# Patient Record
Sex: MALE | Race: WHITE | NOT HISPANIC OR LATINO | Employment: OTHER | URBAN - NONMETROPOLITAN AREA
[De-identification: names, ages, dates, MRNs, and addresses within clinical notes are randomized per-mention and may not be internally consistent; named-entity substitution may affect disease eponyms.]

---

## 2017-01-01 ENCOUNTER — HOSPITAL ENCOUNTER (OUTPATIENT)
Facility: HOSPITAL | Age: 77
Discharge: HOME | End: 2017-02-24
Attending: INTERNAL MEDICINE | Admitting: INTERNAL MEDICINE

## 2017-01-01 ENCOUNTER — HOSPITAL ENCOUNTER (OUTPATIENT)
Dept: CT IMAGING | Facility: HOSPITAL | Age: 77
Discharge: HOME | End: 2017-01-10
Attending: INTERNAL MEDICINE | Admitting: INTERNAL MEDICINE

## 2017-01-01 ENCOUNTER — OFFICE VISIT (OUTPATIENT)
Dept: CARDIOLOGY | Facility: CLINIC | Age: 77
End: 2017-01-01

## 2017-01-01 ENCOUNTER — INFUSION (OUTPATIENT)
Dept: ONCOLOGY | Facility: HOSPITAL | Age: 77
End: 2017-01-01
Attending: INTERNAL MEDICINE

## 2017-01-01 ENCOUNTER — TELEPHONE (OUTPATIENT)
Dept: CARDIOLOGY | Facility: CLINIC | Age: 77
End: 2017-01-01

## 2017-01-01 ENCOUNTER — APPOINTMENT (OUTPATIENT)
Dept: PULMONOLOGY | Facility: HOSPITAL | Age: 77
End: 2017-01-01

## 2017-01-01 ENCOUNTER — HOSPITAL ENCOUNTER (OUTPATIENT)
Dept: CT IMAGING | Facility: HOSPITAL | Age: 77
Discharge: HOME | End: 2017-01-10
Attending: INTERNAL MEDICINE

## 2017-01-01 ENCOUNTER — HOSPITAL ENCOUNTER (INPATIENT)
Facility: HOSPITAL | Age: 77
LOS: 3 days | Discharge: HOME HEALTH | End: 2017-05-06
Attending: FAMILY MEDICINE | Admitting: INTERNAL MEDICINE

## 2017-01-01 ENCOUNTER — LAB (OUTPATIENT)
Dept: ONCOLOGY | Facility: CLINIC | Age: 77
End: 2017-01-01

## 2017-01-01 ENCOUNTER — TELEPHONE (OUTPATIENT)
Dept: FAMILY MEDICINE CLINIC | Age: 77
End: 2017-01-01

## 2017-01-01 ENCOUNTER — TELEPHONE (OUTPATIENT)
Dept: CARDIAC SURGERY | Facility: CLINIC | Age: 77
End: 2017-01-01

## 2017-01-01 ENCOUNTER — OFFICE VISIT (OUTPATIENT)
Dept: FAMILY MEDICINE CLINIC | Age: 77
End: 2017-01-01
Payer: MEDICARE

## 2017-01-01 ENCOUNTER — APPOINTMENT (OUTPATIENT)
Dept: GENERAL RADIOLOGY | Facility: HOSPITAL | Age: 77
End: 2017-01-01

## 2017-01-01 ENCOUNTER — HOSPITAL ENCOUNTER (INPATIENT)
Facility: HOSPITAL | Age: 77
LOS: 3 days | Discharge: HOME | End: 2017-04-16
Attending: EMERGENCY MEDICINE | Admitting: INTERNAL MEDICINE

## 2017-01-01 ENCOUNTER — HOSPITAL ENCOUNTER (OUTPATIENT)
Dept: CT IMAGING | Facility: HOSPITAL | Age: 77
End: 2017-01-01

## 2017-01-01 ENCOUNTER — APPOINTMENT (OUTPATIENT)
Dept: ULTRASOUND IMAGING | Facility: HOSPITAL | Age: 77
End: 2017-01-01

## 2017-01-01 ENCOUNTER — HOSPITAL ENCOUNTER (OUTPATIENT)
Dept: PULMONOLOGY | Facility: HOSPITAL | Age: 77
Discharge: HOME | End: 2017-05-01
Admitting: NURSE PRACTITIONER

## 2017-01-01 ENCOUNTER — APPOINTMENT (OUTPATIENT)
Dept: CARDIOLOGY | Facility: HOSPITAL | Age: 77
End: 2017-01-01

## 2017-01-01 ENCOUNTER — APPOINTMENT (OUTPATIENT)
Dept: CARDIOLOGY | Facility: HOSPITAL | Age: 77
End: 2017-01-01
Attending: FAMILY MEDICINE

## 2017-01-01 ENCOUNTER — HOSPITAL ENCOUNTER (OUTPATIENT)
Dept: GENERAL RADIOLOGY | Age: 77
Discharge: HOME | End: 2017-04-05
Payer: MEDICARE

## 2017-01-01 ENCOUNTER — APPOINTMENT (OUTPATIENT)
Dept: CT IMAGING | Facility: HOSPITAL | Age: 77
End: 2017-01-01

## 2017-01-01 ENCOUNTER — HOSPITAL ENCOUNTER (OUTPATIENT)
Dept: CT IMAGING | Facility: HOSPITAL | Age: 77
Discharge: HOME | End: 2017-05-01
Admitting: INTERNAL MEDICINE

## 2017-01-01 ENCOUNTER — OFFICE VISIT (OUTPATIENT)
Dept: CARDIAC SURGERY | Facility: CLINIC | Age: 77
End: 2017-01-01

## 2017-01-01 ENCOUNTER — OFFICE VISIT (OUTPATIENT)
Dept: ONCOLOGY | Facility: CLINIC | Age: 77
End: 2017-01-01

## 2017-01-01 ENCOUNTER — HOSPITAL ENCOUNTER (OUTPATIENT)
Dept: GENERAL RADIOLOGY | Age: 77
Discharge: HOME | End: 2017-01-16
Payer: MEDICARE

## 2017-01-01 ENCOUNTER — TRANSCRIBE ORDERS (OUTPATIENT)
Dept: ADMINISTRATIVE | Facility: HOSPITAL | Age: 77
End: 2017-01-01

## 2017-01-01 VITALS
TEMPERATURE: 97.5 F | SYSTOLIC BLOOD PRESSURE: 114 MMHG | DIASTOLIC BLOOD PRESSURE: 56 MMHG | WEIGHT: 225 LBS | BODY MASS INDEX: 34.21 KG/M2 | OXYGEN SATURATION: 87 % | HEART RATE: 71 BPM | RESPIRATION RATE: 16 BRPM

## 2017-01-01 VITALS
SYSTOLIC BLOOD PRESSURE: 116 MMHG | DIASTOLIC BLOOD PRESSURE: 62 MMHG | TEMPERATURE: 98.2 F | HEART RATE: 103 BPM | BODY MASS INDEX: 31.07 KG/M2 | HEART RATE: 112 BPM | BODY MASS INDEX: 33.3 KG/M2 | SYSTOLIC BLOOD PRESSURE: 110 MMHG | RESPIRATION RATE: 16 BRPM | DIASTOLIC BLOOD PRESSURE: 62 MMHG | OXYGEN SATURATION: 93 % | OXYGEN SATURATION: 94 % | WEIGHT: 205 LBS | HEIGHT: 68 IN | WEIGHT: 219 LBS

## 2017-01-01 VITALS
BODY MASS INDEX: 33.49 KG/M2 | DIASTOLIC BLOOD PRESSURE: 64 MMHG | OXYGEN SATURATION: 97 % | WEIGHT: 221 LBS | HEART RATE: 95 BPM | SYSTOLIC BLOOD PRESSURE: 112 MMHG | HEIGHT: 68 IN

## 2017-01-01 VITALS
DIASTOLIC BLOOD PRESSURE: 70 MMHG | HEART RATE: 67 BPM | OXYGEN SATURATION: 97 % | HEIGHT: 68 IN | SYSTOLIC BLOOD PRESSURE: 110 MMHG | WEIGHT: 232 LBS | BODY MASS INDEX: 35.16 KG/M2

## 2017-01-01 VITALS
HEIGHT: 68 IN | WEIGHT: 216.1 LBS | RESPIRATION RATE: 20 BRPM | OXYGEN SATURATION: 91 % | HEART RATE: 96 BPM | DIASTOLIC BLOOD PRESSURE: 63 MMHG | SYSTOLIC BLOOD PRESSURE: 125 MMHG | BODY MASS INDEX: 32.75 KG/M2 | TEMPERATURE: 98 F

## 2017-01-01 VITALS
DIASTOLIC BLOOD PRESSURE: 62 MMHG | BODY MASS INDEX: 35.31 KG/M2 | WEIGHT: 233 LBS | HEIGHT: 68 IN | HEART RATE: 83 BPM | OXYGEN SATURATION: 94 % | SYSTOLIC BLOOD PRESSURE: 110 MMHG

## 2017-01-01 VITALS
WEIGHT: 217 LBS | HEART RATE: 58 BPM | OXYGEN SATURATION: 96 % | DIASTOLIC BLOOD PRESSURE: 56 MMHG | RESPIRATION RATE: 18 BRPM | HEIGHT: 68 IN | SYSTOLIC BLOOD PRESSURE: 119 MMHG | TEMPERATURE: 97.4 F | BODY MASS INDEX: 32.89 KG/M2

## 2017-01-01 VITALS
TEMPERATURE: 98.1 F | HEART RATE: 70 BPM | DIASTOLIC BLOOD PRESSURE: 72 MMHG | WEIGHT: 230 LBS | BODY MASS INDEX: 34.86 KG/M2 | HEIGHT: 68 IN | RESPIRATION RATE: 20 BRPM | SYSTOLIC BLOOD PRESSURE: 126 MMHG | OXYGEN SATURATION: 96 %

## 2017-01-01 VITALS
SYSTOLIC BLOOD PRESSURE: 118 MMHG | OXYGEN SATURATION: 90 % | DIASTOLIC BLOOD PRESSURE: 60 MMHG | HEIGHT: 68 IN | HEART RATE: 73 BPM | WEIGHT: 233 LBS | BODY MASS INDEX: 35.31 KG/M2

## 2017-01-01 VITALS
HEIGHT: 68 IN | HEART RATE: 71 BPM | DIASTOLIC BLOOD PRESSURE: 79 MMHG | SYSTOLIC BLOOD PRESSURE: 122 MMHG | BODY MASS INDEX: 35.31 KG/M2 | RESPIRATION RATE: 18 BRPM | TEMPERATURE: 97.8 F | WEIGHT: 233 LBS | OXYGEN SATURATION: 95 %

## 2017-01-01 VITALS
OXYGEN SATURATION: 98 % | DIASTOLIC BLOOD PRESSURE: 60 MMHG | SYSTOLIC BLOOD PRESSURE: 110 MMHG | HEIGHT: 68 IN | HEART RATE: 60 BPM | RESPIRATION RATE: 16 BRPM | TEMPERATURE: 98.4 F | WEIGHT: 236.9 LBS | BODY MASS INDEX: 35.9 KG/M2

## 2017-01-01 VITALS
WEIGHT: 227 LBS | BODY MASS INDEX: 34.52 KG/M2 | SYSTOLIC BLOOD PRESSURE: 102 MMHG | RESPIRATION RATE: 16 BRPM | HEART RATE: 67 BPM | DIASTOLIC BLOOD PRESSURE: 56 MMHG | OXYGEN SATURATION: 95 % | TEMPERATURE: 97.8 F

## 2017-01-01 VITALS
OXYGEN SATURATION: 90 % | RESPIRATION RATE: 16 BRPM | DIASTOLIC BLOOD PRESSURE: 70 MMHG | HEART RATE: 89 BPM | SYSTOLIC BLOOD PRESSURE: 138 MMHG | TEMPERATURE: 97.9 F

## 2017-01-01 VITALS
HEIGHT: 68 IN | DIASTOLIC BLOOD PRESSURE: 64 MMHG | HEART RATE: 97 BPM | WEIGHT: 218 LBS | BODY MASS INDEX: 33.04 KG/M2 | OXYGEN SATURATION: 94 % | SYSTOLIC BLOOD PRESSURE: 118 MMHG

## 2017-01-01 VITALS
OXYGEN SATURATION: 96 % | SYSTOLIC BLOOD PRESSURE: 86 MMHG | WEIGHT: 236 LBS | RESPIRATION RATE: 18 BRPM | TEMPERATURE: 98 F | HEART RATE: 96 BPM | BODY MASS INDEX: 35.77 KG/M2 | HEIGHT: 68 IN | DIASTOLIC BLOOD PRESSURE: 64 MMHG

## 2017-01-01 DIAGNOSIS — R09.02 HYPOXIA: ICD-10-CM

## 2017-01-01 DIAGNOSIS — D69.6 THROMBOPENIA (HCC): ICD-10-CM

## 2017-01-01 DIAGNOSIS — I25.118 CORONARY ARTERY DISEASE OF NATIVE ARTERY OF NATIVE HEART WITH STABLE ANGINA PECTORIS (HCC): ICD-10-CM

## 2017-01-01 DIAGNOSIS — C38.4 MALIGNANT NEOPLASM OF PLEURA (HCC): ICD-10-CM

## 2017-01-01 DIAGNOSIS — C45.0 MESOTHELIOMA OF PLEURA (HCC): ICD-10-CM

## 2017-01-01 DIAGNOSIS — M54.50 CHRONIC LOW BACK PAIN WITHOUT SCIATICA, UNSPECIFIED BACK PAIN LATERALITY: ICD-10-CM

## 2017-01-01 DIAGNOSIS — R07.9 CHEST PAIN ON EXERTION: ICD-10-CM

## 2017-01-01 DIAGNOSIS — J44.9 CHRONIC OBSTRUCTIVE PULMONARY DISEASE, UNSPECIFIED COPD TYPE (HCC): Chronic | ICD-10-CM

## 2017-01-01 DIAGNOSIS — R20.2 NUMBNESS AND TINGLING OF RIGHT FACE: ICD-10-CM

## 2017-01-01 DIAGNOSIS — I50.9 ACUTE CONGESTIVE HEART FAILURE, UNSPECIFIED CONGESTIVE HEART FAILURE TYPE: Primary | ICD-10-CM

## 2017-01-01 DIAGNOSIS — R73.9 HYPERGLYCEMIA: Primary | ICD-10-CM

## 2017-01-01 DIAGNOSIS — R06.02 SHORTNESS OF BREATH: ICD-10-CM

## 2017-01-01 DIAGNOSIS — R20.0 NUMBNESS AND TINGLING OF RIGHT FACE: ICD-10-CM

## 2017-01-01 DIAGNOSIS — I20.1 VARIANT ANGINA (HCC): ICD-10-CM

## 2017-01-01 DIAGNOSIS — C38.4 MALIGNANT NEOPLASM OF PARIETAL PLEURA (HCC): ICD-10-CM

## 2017-01-01 DIAGNOSIS — C38.4 MALIGNANT NEOPLASM OF PLEURA (HCC): Primary | ICD-10-CM

## 2017-01-01 DIAGNOSIS — I25.119 CORONARY ARTERY DISEASE INVOLVING NATIVE CORONARY ARTERY OF NATIVE HEART WITH ANGINA PECTORIS (HCC): Primary | ICD-10-CM

## 2017-01-01 DIAGNOSIS — Z51.11 ENCOUNTER FOR ANTINEOPLASTIC CHEMOTHERAPY: ICD-10-CM

## 2017-01-01 DIAGNOSIS — I20.9 ISCHEMIC CHEST PAIN (HCC): ICD-10-CM

## 2017-01-01 DIAGNOSIS — E11.9 TYPE 2 DIABETES MELLITUS WITHOUT COMPLICATION, WITHOUT LONG-TERM CURRENT USE OF INSULIN (HCC): ICD-10-CM

## 2017-01-01 DIAGNOSIS — R73.9 HYPERGLYCEMIA: ICD-10-CM

## 2017-01-01 DIAGNOSIS — G50.0 TRIGEMINAL NEURALGIA OF RIGHT SIDE OF FACE: ICD-10-CM

## 2017-01-01 DIAGNOSIS — I10 ESSENTIAL HYPERTENSION: Primary | Chronic | ICD-10-CM

## 2017-01-01 DIAGNOSIS — Z74.09 IMPAIRED FUNCTIONAL MOBILITY AND ACTIVITY TOLERANCE: ICD-10-CM

## 2017-01-01 DIAGNOSIS — D64.9 ANEMIA, UNSPECIFIED TYPE: ICD-10-CM

## 2017-01-01 DIAGNOSIS — C45.9 MESOTHELIOMA (HCC): ICD-10-CM

## 2017-01-01 DIAGNOSIS — I25.10 CORONARY ARTERY DISEASE INVOLVING NATIVE CORONARY ARTERY OF NATIVE HEART WITHOUT ANGINA PECTORIS: ICD-10-CM

## 2017-01-01 DIAGNOSIS — E03.2 DRUG-INDUCED HYPOTHYROIDISM: ICD-10-CM

## 2017-01-01 DIAGNOSIS — R79.89 ELEVATED SERUM CREATININE: ICD-10-CM

## 2017-01-01 DIAGNOSIS — R06.02 SHORTNESS OF BREATH: Primary | ICD-10-CM

## 2017-01-01 DIAGNOSIS — R20.2 NUMBNESS AND TINGLING OF RIGHT FACE: Primary | ICD-10-CM

## 2017-01-01 DIAGNOSIS — N28.9 DECREASED RENAL FUNCTION: Primary | ICD-10-CM

## 2017-01-01 DIAGNOSIS — F41.9 ANXIETY: ICD-10-CM

## 2017-01-01 DIAGNOSIS — N28.9 RENAL INSUFFICIENCY: ICD-10-CM

## 2017-01-01 DIAGNOSIS — G44.52 NEW DAILY PERSISTENT HEADACHE: ICD-10-CM

## 2017-01-01 DIAGNOSIS — N17.9 ACUTE RENAL FAILURE, UNSPECIFIED ACUTE RENAL FAILURE TYPE (HCC): ICD-10-CM

## 2017-01-01 DIAGNOSIS — I20.9 ISCHEMIC CHEST PAIN (HCC): Primary | ICD-10-CM

## 2017-01-01 DIAGNOSIS — C38.4 MALIGNANT NEOPLASM OF PARIETAL PLEURA (HCC): Primary | ICD-10-CM

## 2017-01-01 DIAGNOSIS — G89.29 CHRONIC LOW BACK PAIN WITHOUT SCIATICA, UNSPECIFIED BACK PAIN LATERALITY: ICD-10-CM

## 2017-01-01 DIAGNOSIS — K59.00 CONSTIPATION, UNSPECIFIED CONSTIPATION TYPE: Primary | ICD-10-CM

## 2017-01-01 DIAGNOSIS — J44.9 CHRONIC OBSTRUCTIVE PULMONARY DISEASE, UNSPECIFIED COPD TYPE (HCC): Primary | Chronic | ICD-10-CM

## 2017-01-01 DIAGNOSIS — R06.02 SOB (SHORTNESS OF BREATH): ICD-10-CM

## 2017-01-01 DIAGNOSIS — R20.0 NUMBNESS AND TINGLING OF RIGHT FACE: Primary | ICD-10-CM

## 2017-01-01 DIAGNOSIS — C45.0 MESOTHELIOMA (PLEURAL) (HCC): ICD-10-CM

## 2017-01-01 DIAGNOSIS — I25.119 CORONARY ARTERY DISEASE INVOLVING NATIVE CORONARY ARTERY OF NATIVE HEART WITH ANGINA PECTORIS (HCC): ICD-10-CM

## 2017-01-01 DIAGNOSIS — I10 ESSENTIAL HYPERTENSION: Chronic | ICD-10-CM

## 2017-01-01 DIAGNOSIS — K27.9 PUD (PEPTIC ULCER DISEASE): Primary | ICD-10-CM

## 2017-01-01 DIAGNOSIS — Z74.09 IMPAIRED FUNCTIONAL MOBILITY, BALANCE, GAIT, AND ENDURANCE: ICD-10-CM

## 2017-01-01 DIAGNOSIS — M15.9 PRIMARY OSTEOARTHRITIS INVOLVING MULTIPLE JOINTS: ICD-10-CM

## 2017-01-01 DIAGNOSIS — I95.9 HYPOTENSION, UNSPECIFIED HYPOTENSION TYPE: Primary | ICD-10-CM

## 2017-01-01 DIAGNOSIS — J90 PLEURAL EFFUSION: ICD-10-CM

## 2017-01-01 DIAGNOSIS — E87.5 HYPERKALEMIA: Primary | ICD-10-CM

## 2017-01-01 DIAGNOSIS — I25.118 ATHEROSCLEROTIC HEART DISEASE OF NATIVE CORONARY ARTERY WITH OTHER FORMS OF ANGINA PECTORIS (HCC): ICD-10-CM

## 2017-01-01 DIAGNOSIS — C45.0 MESOTHELIOMA (PLEURAL) (HCC): Primary | ICD-10-CM

## 2017-01-01 DIAGNOSIS — J96.11 CHRONIC RESPIRATORY FAILURE WITH HYPOXIA (HCC): ICD-10-CM

## 2017-01-01 LAB
ABO + RH BLD: NORMAL
ABO GROUP BLD: NORMAL
ALBUMIN SERPL-MCNC: 3.2 G/DL (ref 3.5–5)
ALBUMIN SERPL-MCNC: 3.2 G/DL (ref 3.5–5.2)
ALBUMIN SERPL-MCNC: 3.8 G/DL (ref 3.5–5)
ALBUMIN SERPL-MCNC: 4.2 G/DL (ref 3.5–5)
ALBUMIN SERPL-MCNC: 4.4 G/DL (ref 3.5–5)
ALBUMIN/GLOB SERPL: 0.8 G/DL (ref 1.1–2.5)
ALBUMIN/GLOB SERPL: 1 G/DL (ref 1.1–2.5)
ALBUMIN/GLOB SERPL: 1.1 G/DL
ALBUMIN/GLOB SERPL: 1.3 G/DL (ref 1.1–2.5)
ALP BLD-CCNC: 113 U/L (ref 40–130)
ALP SERPL-CCNC: 111 U/L (ref 24–120)
ALP SERPL-CCNC: 125 U/L (ref 24–120)
ALP SERPL-CCNC: 73 U/L (ref 24–120)
ALP SERPL-CCNC: 89 U/L (ref 38–126)
ALT SERPL W P-5'-P-CCNC: 18 U/L (ref 0–54)
ALT SERPL W P-5'-P-CCNC: 24 U/L (ref 0–54)
ALT SERPL W P-5'-P-CCNC: 37 U/L (ref 21–72)
ALT SERPL W P-5'-P-CCNC: 48 U/L (ref 0–54)
ALT SERPL-CCNC: 24 U/L (ref 5–41)
ANION GAP SERPL CALCULATED.3IONS-SCNC: 10 MMOL/L (ref 4–13)
ANION GAP SERPL CALCULATED.3IONS-SCNC: 11 MMOL/L (ref 4–13)
ANION GAP SERPL CALCULATED.3IONS-SCNC: 12 MMOL/L (ref 4–13)
ANION GAP SERPL CALCULATED.3IONS-SCNC: 12 MMOL/L (ref 4–13)
ANION GAP SERPL CALCULATED.3IONS-SCNC: 13 MMOL/L
ANION GAP SERPL CALCULATED.3IONS-SCNC: 13 MMOL/L (ref 4–13)
ANION GAP SERPL CALCULATED.3IONS-SCNC: 13 MMOL/L (ref 4–13)
ANION GAP SERPL CALCULATED.3IONS-SCNC: 14 MMOL/L (ref 4–13)
ANION GAP SERPL CALCULATED.3IONS-SCNC: 14 MMOL/L (ref 7–19)
ANION GAP SERPL CALCULATED.3IONS-SCNC: 15 MMOL/L (ref 7–19)
ANION GAP SERPL CALCULATED.3IONS-SCNC: 9 MMOL/L (ref 4–13)
ANISOCYTOSIS BLD QL: ABNORMAL
ARTERIAL PATENCY WRIST A: ABNORMAL
ARTERIAL PATENCY WRIST A: ABNORMAL
AST SERPL-CCNC: 20 U/L (ref 5–40)
AST SERPL-CCNC: 29 U/L (ref 5–40)
AST SERPL-CCNC: 33 U/L (ref 7–45)
AST SERPL-CCNC: 36 U/L (ref 7–45)
AST SERPL-CCNC: 36 U/L (ref 7–45)
ATMOSPHERIC PRESS: ABNORMAL MMHG
ATMOSPHERIC PRESS: ABNORMAL MMHG
AUTO MIXED CELLS #: 0.8 10*3/UL (ref 0.1–1.5)
AUTO MIXED CELLS %: 10.3 % (ref 0.2–15.1)
BACTERIA SPEC AEROBE CULT: NORMAL
BACTERIA SPEC AEROBE CULT: NORMAL
BACTERIA UR QL AUTO: ABNORMAL /HPF
BANDED NEUTROPHILS RELATIVE PERCENT: 1 % (ref 0–5)
BASE EXCESS BLDA CALC-SCNC: 1.8 MMOL/L (ref -2–2)
BASE EXCESS BLDA CALC-SCNC: 4.7 MMOL/L (ref -2–2)
BASOPHILS # BLD AUTO: 0.03 10*3/MM3 (ref 0–0.2)
BASOPHILS # BLD AUTO: 0.03 10*3/MM3 (ref 0–0.2)
BASOPHILS # BLD AUTO: 0.04 10*3/MM3 (ref 0–0.2)
BASOPHILS # BLD MANUAL: 0.11 10*3/MM3 (ref 0–0.2)
BASOPHILS ABSOLUTE: 0 K/UL (ref 0–0.2)
BASOPHILS ABSOLUTE: 0 K/UL (ref 0–0.2)
BASOPHILS NFR BLD AUTO: 0.2 % (ref 0–2)
BASOPHILS NFR BLD AUTO: 0.4 % (ref 0–2)
BASOPHILS NFR BLD AUTO: 0.5 % (ref 0–2)
BASOPHILS NFR BLD AUTO: 0.6 % (ref 0–2)
BASOPHILS NFR BLD AUTO: 0.7 % (ref 0–2)
BASOPHILS NFR BLD AUTO: 1 % (ref 0–2)
BASOPHILS RELATIVE PERCENT: 0 % (ref 0–1)
BASOPHILS RELATIVE PERCENT: 0.3 % (ref 0–1)
BDY SITE: ABNORMAL
BDY SITE: ABNORMAL
BH BB BLOOD EXPIRATION DATE: NORMAL
BH BB BLOOD TYPE BARCODE: 5100
BH BB DISPENSE STATUS: NORMAL
BH BB PRODUCT CODE: NORMAL
BH BB UNIT NUMBER: NORMAL
BH CV ECHO MEAS - AO MAX PG (FULL): 13.4 MMHG
BH CV ECHO MEAS - AO MAX PG (FULL): 13.4 MMHG
BH CV ECHO MEAS - AO MAX PG: 17.1 MMHG
BH CV ECHO MEAS - AO MAX PG: 17.5 MMHG
BH CV ECHO MEAS - AO MEAN PG (FULL): 6 MMHG
BH CV ECHO MEAS - AO MEAN PG (FULL): 7 MMHG
BH CV ECHO MEAS - AO MEAN PG: 10 MMHG
BH CV ECHO MEAS - AO MEAN PG: 8 MMHG
BH CV ECHO MEAS - AO ROOT AREA (BSA CORRECTED): 1.4
BH CV ECHO MEAS - AO ROOT AREA (BSA CORRECTED): 1.5
BH CV ECHO MEAS - AO ROOT AREA: 7.1 CM^2
BH CV ECHO MEAS - AO ROOT AREA: 7.5 CM^2
BH CV ECHO MEAS - AO ROOT DIAM: 3 CM
BH CV ECHO MEAS - AO ROOT DIAM: 3.1 CM
BH CV ECHO MEAS - AO V2 MAX: 207 CM/SEC
BH CV ECHO MEAS - AO V2 MAX: 209 CM/SEC
BH CV ECHO MEAS - AO V2 MEAN: 129 CM/SEC
BH CV ECHO MEAS - AO V2 MEAN: 146 CM/SEC
BH CV ECHO MEAS - AO V2 VTI: 41.8 CM
BH CV ECHO MEAS - AO V2 VTI: 46.6 CM
BH CV ECHO MEAS - AVA(I,A): 1.8 CM^2
BH CV ECHO MEAS - AVA(I,A): 2.7 CM^2
BH CV ECHO MEAS - AVA(I,D): 1.8 CM^2
BH CV ECHO MEAS - AVA(I,D): 2.7 CM^2
BH CV ECHO MEAS - AVA(V,A): 1.6 CM^2
BH CV ECHO MEAS - AVA(V,A): 2.4 CM^2
BH CV ECHO MEAS - AVA(V,D): 1.6 CM^2
BH CV ECHO MEAS - AVA(V,D): 2.4 CM^2
BH CV ECHO MEAS - BSA(HAYCOCK): 2.2 M^2
BH CV ECHO MEAS - BSA(HAYCOCK): 2.3 M^2
BH CV ECHO MEAS - BSA: 2.1 M^2
BH CV ECHO MEAS - BSA: 2.2 M^2
BH CV ECHO MEAS - BZI_BMI: 32.7 KILOGRAMS/M^2
BH CV ECHO MEAS - BZI_BMI: 35.4 KILOGRAMS/M^2
BH CV ECHO MEAS - BZI_METRIC_HEIGHT: 172.7 CM
BH CV ECHO MEAS - BZI_METRIC_HEIGHT: 172.7 CM
BH CV ECHO MEAS - BZI_METRIC_WEIGHT: 105.7 KG
BH CV ECHO MEAS - BZI_METRIC_WEIGHT: 97.5 KG
BH CV ECHO MEAS - CONTRAST EF 4CH: 57.2 ML/M^2
BH CV ECHO MEAS - CONTRAST EF 4CH: 60.3 ML/M^2
BH CV ECHO MEAS - EDV(CUBED): 85.2 ML
BH CV ECHO MEAS - EDV(MOD-SP4): 129 ML
BH CV ECHO MEAS - EDV(MOD-SP4): 160 ML
BH CV ECHO MEAS - EDV(TEICH): 87.7 ML
BH CV ECHO MEAS - EF(CUBED): 68.3 %
BH CV ECHO MEAS - EF(MOD-SP4): 57.2 %
BH CV ECHO MEAS - EF(MOD-SP4): 60.3 %
BH CV ECHO MEAS - EF(TEICH): 60.1 %
BH CV ECHO MEAS - ESV(CUBED): 27 ML
BH CV ECHO MEAS - ESV(MOD-SP4): 55.2 ML
BH CV ECHO MEAS - ESV(MOD-SP4): 63.5 ML
BH CV ECHO MEAS - ESV(TEICH): 35 ML
BH CV ECHO MEAS - FS: 31.8 %
BH CV ECHO MEAS - IVS/LVPW: 0.73
BH CV ECHO MEAS - IVSD: 0.8 CM
BH CV ECHO MEAS - LA DIMENSION: 3.6 CM
BH CV ECHO MEAS - LA DIMENSION: 4.3 CM
BH CV ECHO MEAS - LA/AO: 1.2
BH CV ECHO MEAS - LA/AO: 1.4
BH CV ECHO MEAS - LAT PEAK E' VEL: 9.6 CM/SEC
BH CV ECHO MEAS - LAT PEAK E' VEL: 9.8 CM/SEC
BH CV ECHO MEAS - LV DIASTOLIC VOL/BSA (35-75): 59.1 ML/M^2
BH CV ECHO MEAS - LV DIASTOLIC VOL/BSA (35-75): 75.9 ML/M^2
BH CV ECHO MEAS - LV MASS(C)D: 137.8 GRAMS
BH CV ECHO MEAS - LV MASS(C)DI: 63.2 GRAMS/M^2
BH CV ECHO MEAS - LV MAX PG: 3.7 MMHG
BH CV ECHO MEAS - LV MAX PG: 4.1 MMHG
BH CV ECHO MEAS - LV MEAN PG: 2 MMHG
BH CV ECHO MEAS - LV MEAN PG: 3 MMHG
BH CV ECHO MEAS - LV SYSTOLIC VOL/BSA (12-30): 25.3 ML/M^2
BH CV ECHO MEAS - LV SYSTOLIC VOL/BSA (12-30): 30.1 ML/M^2
BH CV ECHO MEAS - LV V1 MAX: 101 CM/SEC
BH CV ECHO MEAS - LV V1 MAX: 96.1 CM/SEC
BH CV ECHO MEAS - LV V1 MEAN: 68.3 CM/SEC
BH CV ECHO MEAS - LV V1 MEAN: 77.5 CM/SEC
BH CV ECHO MEAS - LV V1 VTI: 21.5 CM
BH CV ECHO MEAS - LV V1 VTI: 25.2 CM
BH CV ECHO MEAS - LVIDD: 4.4 CM
BH CV ECHO MEAS - LVIDS: 3 CM
BH CV ECHO MEAS - LVLD AP4: 7.8 CM
BH CV ECHO MEAS - LVLD AP4: 8.5 CM
BH CV ECHO MEAS - LVLS AP4: 6.6 CM
BH CV ECHO MEAS - LVLS AP4: 6.7 CM
BH CV ECHO MEAS - LVOT AREA (M): 3.5 CM^2
BH CV ECHO MEAS - LVOT AREA (M): 4.9 CM^2
BH CV ECHO MEAS - LVOT AREA: 3.5 CM^2
BH CV ECHO MEAS - LVOT AREA: 4.9 CM^2
BH CV ECHO MEAS - LVOT DIAM: 2.1 CM
BH CV ECHO MEAS - LVOT DIAM: 2.5 CM
BH CV ECHO MEAS - LVPWD: 1.1 CM
BH CV ECHO MEAS - MED PEAK E' VEL: 7.29 CM/SEC
BH CV ECHO MEAS - MED PEAK E' VEL: 8.81 CM/SEC
BH CV ECHO MEAS - MV A MAX VEL: 69.4 CM/SEC
BH CV ECHO MEAS - MV A MAX VEL: 75.7 CM/SEC
BH CV ECHO MEAS - MV DEC TIME: 0.16 SEC
BH CV ECHO MEAS - MV DEC TIME: 0.17 SEC
BH CV ECHO MEAS - MV E MAX VEL: 74.7 CM/SEC
BH CV ECHO MEAS - MV E MAX VEL: 78.6 CM/SEC
BH CV ECHO MEAS - MV E/A: 1
BH CV ECHO MEAS - MV E/A: 1.1
BH CV ECHO MEAS - RAP SYSTOLE: 5 MMHG
BH CV ECHO MEAS - RAP SYSTOLE: 5 MMHG
BH CV ECHO MEAS - RVSP: 18.2 MMHG
BH CV ECHO MEAS - RVSP: 31.2 MMHG
BH CV ECHO MEAS - SI(AO): 135.5 ML/M^2
BH CV ECHO MEAS - SI(AO): 166.9 ML/M^2
BH CV ECHO MEAS - SI(CUBED): 26.7 ML/M^2
BH CV ECHO MEAS - SI(LVOT): 34.1 ML/M^2
BH CV ECHO MEAS - SI(LVOT): 58.7 ML/M^2
BH CV ECHO MEAS - SI(MOD-SP4): 33.8 ML/M^2
BH CV ECHO MEAS - SI(MOD-SP4): 45.8 ML/M^2
BH CV ECHO MEAS - SI(TEICH): 24.2 ML/M^2
BH CV ECHO MEAS - SV(AO): 295.5 ML
BH CV ECHO MEAS - SV(AO): 351.7 ML
BH CV ECHO MEAS - SV(CUBED): 58.2 ML
BH CV ECHO MEAS - SV(LVOT): 123.7 ML
BH CV ECHO MEAS - SV(LVOT): 74.5 ML
BH CV ECHO MEAS - SV(MOD-SP4): 73.8 ML
BH CV ECHO MEAS - SV(MOD-SP4): 96.5 ML
BH CV ECHO MEAS - SV(TEICH): 52.7 ML
BH CV ECHO MEAS - TR MAX VEL: 182 CM/SEC
BH CV ECHO MEAS - TR MAX VEL: 256 CM/SEC
BILIRUB SERPL-MCNC: 0.4 MG/DL (ref 0.2–1.2)
BILIRUB SERPL-MCNC: 0.5 MG/DL (ref 0.1–1)
BILIRUB SERPL-MCNC: 0.5 MG/DL (ref 0.2–1.3)
BILIRUB SERPL-MCNC: 0.7 MG/DL (ref 0.1–1)
BILIRUB SERPL-MCNC: 0.8 MG/DL (ref 0.1–1)
BILIRUB UR QL STRIP: NEGATIVE
BILIRUB UR QL STRIP: NEGATIVE
BILIRUBIN URINE: ABNORMAL
BLD GP AB SCN SERPL QL: NEGATIVE
BLOOD, URINE: NEGATIVE
BUN BLD-MCNC: 15 MG/DL (ref 5–21)
BUN BLD-MCNC: 20 MG/DL (ref 5–21)
BUN BLD-MCNC: 22 MG/DL (ref 5–21)
BUN BLD-MCNC: 24 MG/DL (ref 5–21)
BUN BLD-MCNC: 24 MG/DL (ref 9–21)
BUN BLD-MCNC: 32 MG/DL (ref 5–21)
BUN BLD-MCNC: 36 MG/DL (ref 5–21)
BUN BLD-MCNC: 36 MG/DL (ref 5–21)
BUN BLD-MCNC: 40 MG/DL (ref 5–21)
BUN BLD-MCNC: 42 MG/DL (ref 5–21)
BUN BLD-MCNC: 43 MG/DL (ref 5–21)
BUN BLD-MCNC: 45 MG/DL (ref 5–21)
BUN BLD-MCNC: 45 MG/DL (ref 5–21)
BUN BLDV-MCNC: 36 MG/DL (ref 8–23)
BUN BLDV-MCNC: 46 MG/DL (ref 8–23)
BUN/CREAT SERPL: 12.5 (ref 7–25)
BUN/CREAT SERPL: 14.8 (ref 7–25)
BUN/CREAT SERPL: 15.2 (ref 7–25)
BUN/CREAT SERPL: 15.8 (ref 7–25)
BUN/CREAT SERPL: 16.7 (ref 7–25)
BUN/CREAT SERPL: 17.9 (ref 7–25)
BUN/CREAT SERPL: 18.8 (ref 7–25)
BUN/CREAT SERPL: 21.8 (ref 7–25)
BUN/CREAT SERPL: 25 (ref 7–25)
BUN/CREAT SERPL: 27.4 (ref 7–25)
BUN/CREAT SERPL: 28.6 (ref 7–25)
BUN/CREAT SERPL: 30.5 (ref 7–25)
BUN/CREAT SERPL: 31.9 (ref 7–25)
C-REACTIVE PROTEIN: 0.3 MG/L (ref 0–5)
CALCIUM IONIZED: 1.18 MMOL/L (ref 1.1–1.3)
CALCIUM SERPL-MCNC: 9.2 MG/DL (ref 8.8–10.2)
CALCIUM SERPL-MCNC: 9.4 MG/DL (ref 8.8–10.2)
CALCIUM SPEC-SCNC: 10 MG/DL (ref 8.4–10.4)
CALCIUM SPEC-SCNC: 8.6 MG/DL (ref 8.4–10.4)
CALCIUM SPEC-SCNC: 9 MG/DL (ref 8.4–10.4)
CALCIUM SPEC-SCNC: 9.1 MG/DL (ref 8.4–10.2)
CALCIUM SPEC-SCNC: 9.3 MG/DL (ref 8.4–10.4)
CALCIUM SPEC-SCNC: 9.4 MG/DL (ref 8.4–10.4)
CALCIUM SPEC-SCNC: 9.5 MG/DL (ref 8.4–10.4)
CALCIUM SPEC-SCNC: 9.5 MG/DL (ref 8.4–10.4)
CALCIUM SPEC-SCNC: 9.6 MG/DL (ref 8.4–10.4)
CHLORIDE BLD-SCNC: 95 MMOL/L (ref 98–111)
CHLORIDE BLD-SCNC: 95 MMOL/L (ref 98–111)
CHLORIDE SERPL-SCNC: 100 MMOL/L (ref 98–110)
CHLORIDE SERPL-SCNC: 101 MMOL/L (ref 98–110)
CHLORIDE SERPL-SCNC: 102 MMOL/L (ref 98–110)
CHLORIDE SERPL-SCNC: 102 MMOL/L (ref 98–110)
CHLORIDE SERPL-SCNC: 103 MMOL/L (ref 98–110)
CHLORIDE SERPL-SCNC: 91 MMOL/L (ref 98–110)
CHLORIDE SERPL-SCNC: 93 MMOL/L (ref 98–110)
CHLORIDE SERPL-SCNC: 94 MMOL/L (ref 98–110)
CHLORIDE SERPL-SCNC: 95 MMOL/L (ref 98–110)
CHLORIDE SERPL-SCNC: 95 MMOL/L (ref 98–110)
CHLORIDE SERPL-SCNC: 96 MMOL/L (ref 98–110)
CHLORIDE SERPL-SCNC: 97 MMOL/L (ref 98–110)
CHLORIDE SERPL-SCNC: 99 MMOL/L (ref 98–107)
CK MB SERPL-CCNC: 0.61 NG/ML (ref 0–5)
CK MB SERPL-CCNC: 0.67 NG/ML (ref 0–5)
CK SERPL-CCNC: 31 U/L (ref 0–203)
CLARITY UR: CLEAR
CLARITY UR: CLEAR
CLARITY: CLEAR
CO2 SERPL-SCNC: 26 MMOL/L (ref 24–31)
CO2 SERPL-SCNC: 27 MMOL/L (ref 24–31)
CO2 SERPL-SCNC: 28 MMOL/L (ref 24–31)
CO2 SERPL-SCNC: 29 MMOL/L (ref 24–31)
CO2 SERPL-SCNC: 29 MMOL/L (ref 24–31)
CO2 SERPL-SCNC: 30 MMOL/L (ref 24–31)
CO2 SERPL-SCNC: 30 MMOL/L (ref 24–31)
CO2 SERPL-SCNC: 31 MMOL/L (ref 22–30)
CO2 SERPL-SCNC: 31 MMOL/L (ref 24–31)
CO2 SERPL-SCNC: 31 MMOL/L (ref 24–31)
CO2 SERPL-SCNC: 32 MMOL/L (ref 24–31)
CO2: 25 MMOL/L (ref 22–29)
CO2: 26 MEQ/L (ref 21–32)
CO2: 27 MMOL/L (ref 22–29)
COLOR UR: YELLOW
COLOR UR: YELLOW
COLOR: ABNORMAL
CREAT BLD-MCNC: 1.05 MG/DL (ref 0.5–1.4)
CREAT BLD-MCNC: 1.1 MG/DL (ref 0.8–1.5)
CREAT BLD-MCNC: 1.12 MG/DL (ref 0.5–1.4)
CREAT BLD-MCNC: 1.13 MG/DL (ref 0.5–1.4)
CREAT BLD-MCNC: 1.2 MG/DL (ref 0.5–1.4)
CREAT BLD-MCNC: 1.28 MG/DL (ref 0.5–1.4)
CREAT BLD-MCNC: 1.39 MG/DL (ref 0.5–1.4)
CREAT BLD-MCNC: 1.4 MG/DL (ref 0.5–1.4)
CREAT BLD-MCNC: 1.64 MG/DL (ref 0.5–1.4)
CREAT BLD-MCNC: 1.8 MG/DL (ref 0.5–1.4)
CREAT BLD-MCNC: 2.15 MG/DL (ref 0.5–1.4)
CREAT BLD-MCNC: 2.83 MG/DL (ref 0.5–1.4)
CREAT BLD-MCNC: 2.83 MG/DL (ref 0.5–1.4)
CREAT BLDA-MCNC: 1.2 MG/DL (ref 0.6–1.3)
CREAT SERPL-MCNC: 1.9 MG/DL (ref 0.5–1.2)
CREAT SERPL-MCNC: 2 MG/DL (ref 0.5–1.2)
CROSSMATCH INTERPRETATION: NORMAL
CRP SERPL-MCNC: 15.7 MG/DL (ref 0–0.99)
D DIMER PPP FEU-MCNC: 1.58 MG/L (FEU) (ref 0–0.5)
D-LACTATE SERPL-SCNC: 1.2 MMOL/L (ref 0.5–2)
D-LACTATE SERPL-SCNC: 2.5 MMOL/L (ref 0.5–2)
DEPRECATED RDW RBC AUTO: 44 FL (ref 40–54)
DEPRECATED RDW RBC AUTO: 46 FL (ref 40–54)
DEPRECATED RDW RBC AUTO: 46.2 FL (ref 40–54)
DEPRECATED RDW RBC AUTO: 46.2 FL (ref 40–54)
DEPRECATED RDW RBC AUTO: 46.5 FL (ref 40–54)
DEPRECATED RDW RBC AUTO: 46.5 FL (ref 40–54)
DEPRECATED RDW RBC AUTO: 46.6 FL (ref 40–54)
DEPRECATED RDW RBC AUTO: 46.7 FL (ref 40–54)
DEPRECATED RDW RBC AUTO: 47 FL (ref 40–54)
DEPRECATED RDW RBC AUTO: 48.1 FL (ref 40–54)
DEPRECATED RDW RBC AUTO: 51.7 FL (ref 40–54)
DEPRECATED RDW RBC AUTO: 61.1 FL (ref 40–54)
E/E' RATIO: 10.2
E/E' RATIO: 8.9
EOSINOPHIL # BLD AUTO: 0 10*3/MM3 (ref 0–0.7)
EOSINOPHIL # BLD AUTO: 0.12 10*3/MM3 (ref 0–0.7)
EOSINOPHIL # BLD AUTO: 0.16 10*3/MM3 (ref 0–0.7)
EOSINOPHIL # BLD AUTO: 0.16 10*3/MM3 (ref 0–0.7)
EOSINOPHIL # BLD AUTO: 0.2 10*3/MM3 (ref 0–0.7)
EOSINOPHIL # BLD MANUAL: 0.11 10*3/MM3 (ref 0–0.7)
EOSINOPHIL # BLD MANUAL: 0.45 10*3/MM3 (ref 0–0.7)
EOSINOPHIL NFR BLD AUTO: 0 % (ref 0–4)
EOSINOPHIL NFR BLD AUTO: 1.5 % (ref 0–4)
EOSINOPHIL NFR BLD AUTO: 2.1 % (ref 0–4)
EOSINOPHIL NFR BLD AUTO: 2.8 % (ref 0–4)
EOSINOPHIL NFR BLD AUTO: 2.9 % (ref 0–4)
EOSINOPHIL NFR BLD MANUAL: 1 % (ref 0–4)
EOSINOPHIL NFR BLD MANUAL: 4 % (ref 0–4)
EOSINOPHILS ABSOLUTE: 0.07 K/UL (ref 0–0.6)
EOSINOPHILS ABSOLUTE: 0.2 K/UL (ref 0–0.6)
EOSINOPHILS RELATIVE PERCENT: 1 % (ref 0–5)
EOSINOPHILS RELATIVE PERCENT: 2.4 % (ref 0–5)
ERYTHROCYTE [DISTWIDTH] IN BLOOD BY AUTOMATED COUNT: 12 % (ref 12–15)
ERYTHROCYTE [DISTWIDTH] IN BLOOD BY AUTOMATED COUNT: 12.2 % (ref 11.5–14.5)
ERYTHROCYTE [DISTWIDTH] IN BLOOD BY AUTOMATED COUNT: 12.4 % (ref 12–15)
ERYTHROCYTE [DISTWIDTH] IN BLOOD BY AUTOMATED COUNT: 12.5 % (ref 12–15)
ERYTHROCYTE [DISTWIDTH] IN BLOOD BY AUTOMATED COUNT: 12.7 % (ref 12–15)
ERYTHROCYTE [DISTWIDTH] IN BLOOD BY AUTOMATED COUNT: 12.8 % (ref 12–15)
ERYTHROCYTE [DISTWIDTH] IN BLOOD BY AUTOMATED COUNT: 13.5 % (ref 12–15)
ERYTHROCYTE [DISTWIDTH] IN BLOOD BY AUTOMATED COUNT: 13.6 % (ref 12–15)
ERYTHROCYTE [DISTWIDTH] IN BLOOD BY AUTOMATED COUNT: 14.1 % (ref 12–15)
ERYTHROCYTE [DISTWIDTH] IN BLOOD BY AUTOMATED COUNT: 14.7 % (ref 12–15)
ERYTHROCYTE [DISTWIDTH] IN BLOOD BY AUTOMATED COUNT: 18.1 % (ref 12–15)
GAS FLOW AIRWAY: 2 LPM
GAS FLOW AIRWAY: 4 LPM
GFR NON-AFRICAN AMERICAN: 33
GFR NON-AFRICAN AMERICAN: 35
GFR NON-AFRICAN AMERICAN: 54
GFR SERPL CREATININE-BSD FRML MDRD: 22 ML/MIN/1.73
GFR SERPL CREATININE-BSD FRML MDRD: 22 ML/MIN/1.73
GFR SERPL CREATININE-BSD FRML MDRD: 30 ML/MIN/1.73
GFR SERPL CREATININE-BSD FRML MDRD: 37 ML/MIN/1.73
GFR SERPL CREATININE-BSD FRML MDRD: 41 ML/MIN/1.73
GFR SERPL CREATININE-BSD FRML MDRD: 49 ML/MIN/1.73
GFR SERPL CREATININE-BSD FRML MDRD: 50 ML/MIN/1.73
GFR SERPL CREATININE-BSD FRML MDRD: 55 ML/MIN/1.73
GFR SERPL CREATININE-BSD FRML MDRD: 59 ML/MIN/1.73
GFR SERPL CREATININE-BSD FRML MDRD: 63 ML/MIN/1.73
GFR SERPL CREATININE-BSD FRML MDRD: 64 ML/MIN/1.73
GFR SERPL CREATININE-BSD FRML MDRD: 65 ML/MIN/1.73
GFR SERPL CREATININE-BSD FRML MDRD: 69 ML/MIN/1.73
GLOBULIN UR ELPH-MCNC: 3.3 GM/DL
GLOBULIN UR ELPH-MCNC: 3.8 GM/DL
GLOBULIN UR ELPH-MCNC: 3.8 GM/DL
GLOBULIN UR ELPH-MCNC: 3.9 GM/DL
GLOBULIN: 3.8 G/DL
GLUCOSE BLD-MCNC: 102 MG/DL (ref 70–99)
GLUCOSE BLD-MCNC: 110 MG/DL (ref 70–100)
GLUCOSE BLD-MCNC: 124 MG/DL (ref 70–100)
GLUCOSE BLD-MCNC: 127 MG/DL (ref 70–100)
GLUCOSE BLD-MCNC: 141 MG/DL (ref 70–100)
GLUCOSE BLD-MCNC: 150 MG/DL (ref 70–100)
GLUCOSE BLD-MCNC: 158 MG/DL (ref 70–100)
GLUCOSE BLD-MCNC: 164 MG/DL (ref 70–100)
GLUCOSE BLD-MCNC: 175 MG/DL (ref 70–100)
GLUCOSE BLD-MCNC: 202 MG/DL (ref 70–100)
GLUCOSE BLD-MCNC: 239 MG/DL (ref 74–109)
GLUCOSE BLD-MCNC: 282 MG/DL (ref 74–109)
GLUCOSE BLD-MCNC: 89 MG/DL (ref 70–100)
GLUCOSE BLD-MCNC: 90 MG/DL (ref 75–110)
GLUCOSE BLD-MCNC: 96 MG/DL (ref 70–100)
GLUCOSE BLD-MCNC: 98 MG/DL (ref 70–100)
GLUCOSE BLDC GLUCOMTR-MCNC: 118 MG/DL (ref 70–130)
GLUCOSE BLDC GLUCOMTR-MCNC: 128 MG/DL (ref 70–130)
GLUCOSE BLDC GLUCOMTR-MCNC: 130 MG/DL (ref 70–130)
GLUCOSE BLDC GLUCOMTR-MCNC: 131 MG/DL (ref 70–130)
GLUCOSE BLDC GLUCOMTR-MCNC: 134 MG/DL (ref 70–130)
GLUCOSE BLDC GLUCOMTR-MCNC: 137 MG/DL (ref 70–130)
GLUCOSE BLDC GLUCOMTR-MCNC: 143 MG/DL (ref 70–130)
GLUCOSE BLDC GLUCOMTR-MCNC: 144 MG/DL (ref 70–130)
GLUCOSE BLDC GLUCOMTR-MCNC: 165 MG/DL (ref 70–130)
GLUCOSE BLDC GLUCOMTR-MCNC: 168 MG/DL (ref 70–130)
GLUCOSE BLDC GLUCOMTR-MCNC: 174 MG/DL (ref 70–130)
GLUCOSE BLDC GLUCOMTR-MCNC: 182 MG/DL (ref 70–130)
GLUCOSE BLDC GLUCOMTR-MCNC: 190 MG/DL (ref 70–130)
GLUCOSE BLDC GLUCOMTR-MCNC: 214 MG/DL (ref 70–130)
GLUCOSE UR STRIP-MCNC: NEGATIVE MG/DL
GLUCOSE UR STRIP-MCNC: NEGATIVE MG/DL
GLUCOSE URINE: 500 MG/DL
HBA1C MFR BLD: 7 %
HCO3 BLDA-SCNC: 27 MMOL/L (ref 22–26)
HCO3 BLDA-SCNC: 29.8 MMOL/L (ref 22–26)
HCT VFR BLD AUTO: 25.5 % (ref 40–52)
HCT VFR BLD AUTO: 25.8 % (ref 40–52)
HCT VFR BLD AUTO: 27.1 % (ref 40–52)
HCT VFR BLD AUTO: 27.8 % (ref 40–52)
HCT VFR BLD AUTO: 29.3 % (ref 40–52)
HCT VFR BLD AUTO: 29.5 % (ref 40–52)
HCT VFR BLD AUTO: 29.7 % (ref 40–52)
HCT VFR BLD AUTO: 29.7 % (ref 40–52)
HCT VFR BLD AUTO: 29.9 % (ref 40–52)
HCT VFR BLD AUTO: 31.5 % (ref 40–52)
HCT VFR BLD AUTO: 36.1 % (ref 40–52)
HCT VFR BLD AUTO: 38.3 % (ref 40–52)
HCT VFR BLD AUTO: 42.4 % (ref 42–52)
HCT VFR BLD CALC: 32.4 % (ref 42–52)
HCT VFR BLD CALC: 34.6 % (ref 42–52)
HCT VFR BLD CALC: 41.1 % (ref 42–52)
HEMOGLOBIN: 10.9 G/DL (ref 14–18)
HEMOGLOBIN: 11 G/DL (ref 14–18)
HEMOGLOBIN: 14.1 G/DL (ref 14–18)
HEMOGLOBIN: 15 GM/DL (ref 12–18)
HGB BLD-MCNC: 10.7 G/DL (ref 14–18)
HGB BLD-MCNC: 12.3 G/DL (ref 14–18)
HGB BLD-MCNC: 12.5 G/DL (ref 14–18)
HGB BLD-MCNC: 13.1 G/DL (ref 14–18)
HGB BLD-MCNC: 8.6 G/DL (ref 14–18)
HGB BLD-MCNC: 8.7 G/DL (ref 14–18)
HGB BLD-MCNC: 9.4 G/DL (ref 14–18)
HGB BLD-MCNC: 9.5 G/DL (ref 14–18)
HGB BLD-MCNC: 9.7 G/DL (ref 14–18)
HGB BLD-MCNC: 9.8 G/DL (ref 14–18)
HGB BLD-MCNC: 9.9 G/DL (ref 14–18)
HGB UR QL STRIP.AUTO: NEGATIVE
HGB UR QL STRIP.AUTO: NEGATIVE
HOLD SPECIMEN: NORMAL
HOLD SPECIMEN: NORMAL
HYALINE CASTS UR QL AUTO: ABNORMAL /LPF
IMM GRANULOCYTES # BLD: 0.04 10*3/MM3 (ref 0–0.03)
IMM GRANULOCYTES # BLD: 0.04 10*3/MM3 (ref 0–0.03)
IMM GRANULOCYTES # BLD: 0.1 10*3/MM3 (ref 0–0.03)
IMM GRANULOCYTES # BLD: 0.1 10*3/MM3 (ref 0–0.03)
IMM GRANULOCYTES # BLD: 0.45 10*3/MM3 (ref 0–0.03)
IMM GRANULOCYTES NFR BLD: 0.6 % (ref 0–5)
IMM GRANULOCYTES NFR BLD: 0.7 % (ref 0–5)
IMM GRANULOCYTES NFR BLD: 1.3 % (ref 0–5)
IMM GRANULOCYTES NFR BLD: 1.3 % (ref 0–5)
IMM GRANULOCYTES NFR BLD: 2.8 % (ref 0–5)
INR PPP: 1.09 (ref 0.91–1.09)
KETONES UR QL STRIP: NEGATIVE
KETONES UR QL STRIP: NEGATIVE
KETONES, URINE: NEGATIVE MG/DL
LEFT ATRIUM VOLUME INDEX: 24.2 ML/M2
LEFT ATRIUM VOLUME INDEX: 29.1 ML/M2
LEFT ATRIUM VOLUME: 52.7 CM3
LEFT ATRIUM VOLUME: 61.3 CM3
LEUKOCYTE ESTERASE UR QL STRIP.AUTO: NEGATIVE
LEUKOCYTE ESTERASE UR QL STRIP.AUTO: NEGATIVE
LEUKOCYTE ESTERASE, URINE: NEGATIVE
LV EF 2D ECHO EST: 55 %
LV EF 2D ECHO EST: 60 %
LYMPHOCYTES # BLD AUTO: 1.44 10*3/MM3 (ref 0.72–4.86)
LYMPHOCYTES # BLD AUTO: 1.47 10*3/MM3 (ref 0.72–4.86)
LYMPHOCYTES # BLD AUTO: 1.54 10*3/MM3 (ref 0.72–4.86)
LYMPHOCYTES # BLD AUTO: 1.66 10*3/MM3 (ref 0.72–4.86)
LYMPHOCYTES # BLD AUTO: 1.89 10*3/MM3 (ref 0.72–4.86)
LYMPHOCYTES # BLD AUTO: 2.4 10*3/MM3 (ref 0.8–7)
LYMPHOCYTES # BLD MANUAL: 1.58 10*3/MM3 (ref 0.72–4.86)
LYMPHOCYTES # BLD MANUAL: 1.81 10*3/MM3 (ref 0.72–4.86)
LYMPHOCYTES ABSOLUTE: 1 K/UL (ref 1.1–4.5)
LYMPHOCYTES ABSOLUTE: 1.5 K/UL (ref 1.1–4.5)
LYMPHOCYTES NFR BLD AUTO: 11.7 % (ref 15–45)
LYMPHOCYTES NFR BLD AUTO: 18.6 % (ref 15–45)
LYMPHOCYTES NFR BLD AUTO: 18.9 % (ref 15–45)
LYMPHOCYTES NFR BLD AUTO: 24.4 % (ref 15–45)
LYMPHOCYTES NFR BLD AUTO: 26.6 % (ref 15–45)
LYMPHOCYTES NFR BLD AUTO: 32.8 % (ref 10–58.5)
LYMPHOCYTES NFR BLD MANUAL: 14 % (ref 4–12)
LYMPHOCYTES NFR BLD MANUAL: 15 % (ref 15–45)
LYMPHOCYTES NFR BLD MANUAL: 16 % (ref 15–45)
LYMPHOCYTES NFR BLD MANUAL: 18 % (ref 4–12)
LYMPHOCYTES RELATIVE PERCENT: 14 % (ref 20–40)
LYMPHOCYTES RELATIVE PERCENT: 23.5 % (ref 20–40)
MCH RBC QN AUTO: 31.2 PG (ref 28–32)
MCH RBC QN AUTO: 32.2 PG (ref 28–32)
MCH RBC QN AUTO: 32.4 PG (ref 28–32)
MCH RBC QN AUTO: 32.7 PG (ref 28–32)
MCH RBC QN AUTO: 32.9 PG (ref 27–31)
MCH RBC QN AUTO: 32.9 PG (ref 27–31)
MCH RBC QN AUTO: 33.1 PG (ref 28–32)
MCH RBC QN AUTO: 33.2 PG (ref 28–32)
MCH RBC QN AUTO: 33.8 PG (ref 28–32)
MCH RBC QN AUTO: 34.2 PG (ref 27–31)
MCH RBC QN AUTO: 34.9 PG (ref 28–32)
MCH RBC QN AUTO: 35.1 PG (ref 28–32)
MCH RBC QN AUTO: 36 PG (ref 27–31)
MCHC RBC AUTO-ENTMCNC: 29.5 G/DL (ref 33–37)
MCHC RBC AUTO-ENTMCNC: 31.8 G/DL (ref 33–37)
MCHC RBC AUTO-ENTMCNC: 33 G/DL (ref 33–36)
MCHC RBC AUTO-ENTMCNC: 33 G/DL (ref 33–36)
MCHC RBC AUTO-ENTMCNC: 33.1 G/DL (ref 33–36)
MCHC RBC AUTO-ENTMCNC: 33.1 G/DL (ref 33–36)
MCHC RBC AUTO-ENTMCNC: 33.2 G/DL (ref 33–36)
MCHC RBC AUTO-ENTMCNC: 33.3 G/DL (ref 33–36)
MCHC RBC AUTO-ENTMCNC: 33.6 G/DL (ref 33–37)
MCHC RBC AUTO-ENTMCNC: 33.8 G/DL (ref 33–36)
MCHC RBC AUTO-ENTMCNC: 34 G/DL (ref 33–36)
MCHC RBC AUTO-ENTMCNC: 34.1 G/DL (ref 33–36)
MCHC RBC AUTO-ENTMCNC: 34.1 G/DL (ref 33–36)
MCHC RBC AUTO-ENTMCNC: 34.2 G/DL (ref 33–36)
MCHC RBC AUTO-ENTMCNC: 34.3 G/DL (ref 33–37)
MCHC RBC AUTO-ENTMCNC: 35.1 G/DL (ref 33–36)
MCV RBC AUTO: 100 FL (ref 82–95)
MCV RBC AUTO: 100.3 FL (ref 82–95)
MCV RBC AUTO: 100.3 FL (ref 82–95)
MCV RBC AUTO: 100.7 FL (ref 82–95)
MCV RBC AUTO: 101.6 FL (ref 80–94)
MCV RBC AUTO: 102.6 FL (ref 82–95)
MCV RBC AUTO: 102.7 FL (ref 82–95)
MCV RBC AUTO: 103.6 FL (ref 80–94)
MCV RBC AUTO: 104.8 FL (ref 80–94)
MCV RBC AUTO: 111.5 FL (ref 80–94)
MCV RBC AUTO: 94.2 FL (ref 82–95)
MCV RBC AUTO: 94.4 FL (ref 82–95)
MCV RBC AUTO: 94.8 FL (ref 82–95)
MCV RBC AUTO: 95.9 FL (ref 82–95)
MCV RBC AUTO: 98.1 FL (ref 82–95)
MCV RBC AUTO: 99.4 FL (ref 82–95)
MODALITY: ABNORMAL
MODALITY: ABNORMAL
MONOCYTES # BLD AUTO: 0.86 10*3/MM3 (ref 0.19–1.3)
MONOCYTES # BLD AUTO: 0.99 10*3/MM3 (ref 0.19–1.3)
MONOCYTES # BLD AUTO: 1.21 10*3/MM3 (ref 0.19–1.3)
MONOCYTES # BLD AUTO: 1.37 10*3/MM3 (ref 0.19–1.3)
MONOCYTES # BLD AUTO: 1.59 10*3/MM3 (ref 0.19–1.3)
MONOCYTES # BLD AUTO: 1.89 10*3/MM3 (ref 0.19–1.3)
MONOCYTES # BLD AUTO: 2.24 10*3/MM3 (ref 0.19–1.3)
MONOCYTES ABSOLUTE: 0.9 K/UL (ref 0–0.9)
MONOCYTES ABSOLUTE: 1 K/UL (ref 0–0.9)
MONOCYTES NFR BLD AUTO: 11.1 % (ref 4–12)
MONOCYTES NFR BLD AUTO: 13.9 % (ref 4–12)
MONOCYTES NFR BLD AUTO: 17.1 % (ref 4–12)
MONOCYTES NFR BLD AUTO: 17.7 % (ref 4–12)
MONOCYTES NFR BLD AUTO: 17.8 % (ref 4–12)
MONOCYTES RELATIVE PERCENT: 13 % (ref 0–10)
MONOCYTES RELATIVE PERCENT: 16.2 % (ref 0–10)
MYELOCYTES NFR BLD MANUAL: 1 % (ref 0–0)
MYOGLOBIN SERPL-MCNC: 102.1 NG/ML (ref 0–110)
MYOGLOBIN SERPL-MCNC: 74.5 NG/ML (ref 0–110)
NEUTROPHILS # BLD AUTO: 11.54 10*3/MM3 (ref 1.87–8.4)
NEUTROPHILS # BLD AUTO: 3.02 10*3/MM3 (ref 1.87–8.4)
NEUTROPHILS # BLD AUTO: 3.66 10*3/MM3 (ref 1.87–8.4)
NEUTROPHILS # BLD AUTO: 4.2 10*3/MM3 (ref 2–7.8)
NEUTROPHILS # BLD AUTO: 4.63 10*3/MM3 (ref 1.87–8.4)
NEUTROPHILS # BLD AUTO: 5.19 10*3/MM3 (ref 1.87–8.4)
NEUTROPHILS # BLD AUTO: 6.93 10*3/MM3 (ref 1.87–8.4)
NEUTROPHILS # BLD AUTO: 7.25 10*3/MM3 (ref 1.87–8.4)
NEUTROPHILS ABSOLUTE: 3.5 K/UL (ref 1.5–7.5)
NEUTROPHILS ABSOLUTE: 5.3 K/UL (ref 1.5–7.5)
NEUTROPHILS NFR BLD AUTO: 52.1 % (ref 39–78)
NEUTROPHILS NFR BLD AUTO: 53.7 % (ref 39–78)
NEUTROPHILS NFR BLD AUTO: 56.9 % (ref 37–92)
NEUTROPHILS NFR BLD AUTO: 59.6 % (ref 39–78)
NEUTROPHILS NFR BLD AUTO: 67 % (ref 39–78)
NEUTROPHILS NFR BLD AUTO: 71.4 % (ref 39–78)
NEUTROPHILS NFR BLD MANUAL: 64 % (ref 39–78)
NEUTROPHILS NFR BLD MANUAL: 65 % (ref 39–78)
NEUTROPHILS RELATIVE PERCENT: 55.9 % (ref 50–65)
NEUTROPHILS RELATIVE PERCENT: 71 % (ref 50–65)
NEUTS BAND NFR BLD MANUAL: 1 % (ref 0–10)
NITRITE UR QL STRIP: NEGATIVE
NITRITE UR QL STRIP: NEGATIVE
NITRITE, URINE: NEGATIVE
NT-PROBNP SERPL-MCNC: 1110 PG/ML (ref 0–900)
NT-PROBNP SERPL-MCNC: 764 PG/ML (ref 0–900)
PCO2 BLDA: 44.4 MM HG (ref 35–45)
PCO2 BLDA: 46 MM HG (ref 35–45)
PDW BLD-RTO: 11.5 % (ref 11.5–14.5)
PDW BLD-RTO: 12 % (ref 11.5–14.5)
PDW BLD-RTO: 12.7 % (ref 11.5–14.5)
PERFORMED ON: ABNORMAL
PH BLDA: 7.4 PH UNITS (ref 7.35–7.45)
PH BLDA: 7.43 PH UNITS (ref 7.35–7.45)
PH UA: 6
PH UR STRIP.AUTO: 5.5 [PH] (ref 5–8)
PH UR STRIP.AUTO: <=5 [PH] (ref 5–8)
PLAT MORPH BLD: NORMAL
PLAT MORPH BLD: NORMAL
PLATELET # BLD AUTO: 193 10*3/MM3 (ref 130–400)
PLATELET # BLD AUTO: 197 10*3/MM3 (ref 130–400)
PLATELET # BLD AUTO: 201 10*3/MM3 (ref 130–400)
PLATELET # BLD AUTO: 235 10*3/MM3 (ref 130–400)
PLATELET # BLD AUTO: 248 10*3/MM3 (ref 130–400)
PLATELET # BLD AUTO: 269 10*3/MM3 (ref 130–400)
PLATELET # BLD AUTO: 271 10*3/MM3 (ref 130–400)
PLATELET # BLD AUTO: 274 10*3/MM3 (ref 130–400)
PLATELET # BLD AUTO: 276 10*3/MM3 (ref 130–400)
PLATELET # BLD AUTO: 289 10*3/MM3 (ref 130–400)
PLATELET # BLD AUTO: 294 10*3/MM3 (ref 130–400)
PLATELET # BLD AUTO: 314 10*3/MM3 (ref 130–400)
PLATELET # BLD AUTO: 345 10*3/MM3 (ref 130–400)
PLATELET # BLD: 214 K/UL (ref 130–400)
PLATELET # BLD: 296 K/UL (ref 130–400)
PLATELET # BLD: 349 K/UL (ref 130–400)
PLATELET SLIDE REVIEW: ADEQUATE
PMV BLD AUTO: 10.3 FL (ref 6–12)
PMV BLD AUTO: 10.4 FL (ref 6–12)
PMV BLD AUTO: 10.5 FL (ref 6–12)
PMV BLD AUTO: 10.6 FL (ref 6–12)
PMV BLD AUTO: 10.7 FL (ref 6–12)
PMV BLD AUTO: 10.9 FL (ref 6–12)
PMV BLD AUTO: 10.9 FL (ref 6–12)
PMV BLD AUTO: 11 FL (ref 6–12)
PMV BLD AUTO: 11.1 FL (ref 6–12)
PMV BLD AUTO: 11.1 FL (ref 7.4–10.4)
PMV BLD AUTO: 11.2 FL (ref 6–12)
PMV BLD AUTO: 11.2 FL (ref 6–12)
PMV BLD AUTO: 11.4 FL (ref 6–12)
PMV BLD AUTO: 11.4 FL (ref 7.4–10.4)
PMV BLD AUTO: 12.4 FL (ref 7.4–10.4)
PMV BLD AUTO: 9.4 FL (ref 6–12)
PO2 BLDA: 57.8 MM HG (ref 80–100)
PO2 BLDA: 66.4 MM HG (ref 80–100)
POC ANION GAP: 12
POC BUN: 28 MG/DL (ref 7–18)
POC CHLORIDE: 101 MEQ/L (ref 99–110)
POC CREATININE: 1.3 MG/DL (ref 0.3–1.3)
POC HEMATOCRIT: 44 % (ref 37–52)
POC POTASSIUM: 4.5 MEQ/L (ref 3.5–5.1)
POC SODIUM: 139 MEQ/L (ref 136–145)
POLYCHROMASIA BLD QL SMEAR: ABNORMAL
POTASSIUM BLD-SCNC: 4.1 MMOL/L (ref 3.5–5.3)
POTASSIUM BLD-SCNC: 4.2 MMOL/L (ref 3.5–5.3)
POTASSIUM BLD-SCNC: 4.2 MMOL/L (ref 3.6–5)
POTASSIUM BLD-SCNC: 4.3 MMOL/L (ref 3.5–5.3)
POTASSIUM BLD-SCNC: 4.3 MMOL/L (ref 3.5–5.3)
POTASSIUM BLD-SCNC: 4.7 MMOL/L (ref 3.5–5.3)
POTASSIUM BLD-SCNC: 4.8 MMOL/L (ref 3.5–5.3)
POTASSIUM BLD-SCNC: 4.9 MMOL/L (ref 3.5–5.3)
POTASSIUM BLD-SCNC: 5.3 MMOL/L (ref 3.5–5.3)
POTASSIUM SERPL-SCNC: 4.7 MMOL/L (ref 3.5–5)
POTASSIUM SERPL-SCNC: 5.2 MMOL/L (ref 3.5–5)
PROT SERPL-MCNC: 7 G/DL (ref 6.3–8.7)
PROT SERPL-MCNC: 7.6 G/DL (ref 6.3–8.7)
PROT SERPL-MCNC: 7.7 G/DL (ref 6.3–8.7)
PROT SERPL-MCNC: 8.1 G/DL (ref 6.3–8.2)
PROT UR QL STRIP: NEGATIVE
PROT UR QL STRIP: NEGATIVE
PROTEIN UA: ABNORMAL MG/DL
PROTHROMBIN TIME: 14.5 SECONDS (ref 11.9–14.6)
RBC # BLD AUTO: 2.63 10*6/MM3 (ref 4.8–5.9)
RBC # BLD AUTO: 2.7 10*6/MM3 (ref 4.8–5.9)
RBC # BLD AUTO: 2.86 10*6/MM3 (ref 4.8–5.9)
RBC # BLD AUTO: 2.9 10*6/MM3 (ref 4.8–5.9)
RBC # BLD AUTO: 2.95 10*6/MM3 (ref 4.8–5.9)
RBC # BLD AUTO: 2.95 10*6/MM3 (ref 4.8–5.9)
RBC # BLD AUTO: 2.96 10*6/MM3 (ref 4.8–5.9)
RBC # BLD AUTO: 2.98 10*6/MM3 (ref 4.8–5.9)
RBC # BLD AUTO: 3.11 10*6/MM3 (ref 4.8–5.9)
RBC # BLD AUTO: 3.17 10*6/MM3 (ref 4.8–5.9)
RBC # BLD AUTO: 3.52 10*6/MM3 (ref 4.8–5.9)
RBC # BLD AUTO: 3.73 10*6/MM3 (ref 4.8–5.9)
RBC # BLD AUTO: 3.8 10*6/MM3 (ref 4.7–6.1)
RBC # BLD: 3.19 M/UL (ref 4.7–6.1)
RBC # BLD: 3.34 M/UL (ref 4.7–6.1)
RBC # BLD: 3.92 M/UL (ref 4.7–6.1)
RBC # BLD: NORMAL 10*6/UL
RBC # UR: ABNORMAL /HPF
RBC MORPH BLD: NORMAL
REF LAB TEST METHOD: ABNORMAL
RH BLD: POSITIVE
SAO2 % BLDCOA: 90 % (ref 94–100)
SAO2 % BLDCOA: 90 % (ref 94–100)
SAO2 % BLDCOA: 93.5 % (ref 94–100)
SAO2 % BLDCOA: 93.5 % (ref 94–100)
SCAN SLIDE: NORMAL
SCAN SLIDE: NORMAL
SEDIMENTATION RATE, ERYTHROCYTE: 34 MM/HR (ref 0–15)
SODIUM BLD-SCNC: 133 MMOL/L (ref 135–145)
SODIUM BLD-SCNC: 134 MMOL/L (ref 135–145)
SODIUM BLD-SCNC: 135 MMOL/L (ref 136–145)
SODIUM BLD-SCNC: 136 MMOL/L (ref 135–145)
SODIUM BLD-SCNC: 136 MMOL/L (ref 136–145)
SODIUM BLD-SCNC: 137 MMOL/L (ref 135–145)
SODIUM BLD-SCNC: 138 MMOL/L (ref 135–145)
SODIUM BLD-SCNC: 139 MMOL/L (ref 135–145)
SODIUM BLD-SCNC: 139 MMOL/L (ref 135–145)
SODIUM BLD-SCNC: 140 MMOL/L (ref 135–145)
SODIUM BLD-SCNC: 143 MMOL/L (ref 137–145)
SP GR UR STRIP: 1.01 (ref 1–1.03)
SP GR UR STRIP: 1.01 (ref 1–1.03)
SPECIFIC GRAVITY UA: 1.02
SQUAMOUS #/AREA URNS HPF: ABNORMAL /HPF
TOTAL PROTEIN: 7 G/DL (ref 6.6–8.7)
TROPONIN I SERPL-MCNC: 0 NG/ML (ref 0–0.07)
TROPONIN I SERPL-MCNC: 0 NG/ML (ref 0–0.07)
TROPONIN I SERPL-MCNC: <0.012 NG/ML (ref 0–0.03)
UNIT  ABO: NORMAL
UNIT  RH: NORMAL
UROBILINOGEN UR QL STRIP: NORMAL
UROBILINOGEN UR QL STRIP: NORMAL
UROBILINOGEN, URINE: 0.2 E.U./DL
WBC # BLD: 6.3 K/UL (ref 4.8–10.8)
WBC # BLD: 7.3 K/UL (ref 4.8–10.8)
WBC # BLD: 7.5 K/UL (ref 4.8–10.8)
WBC MORPH BLD: NORMAL
WBC MORPH BLD: NORMAL
WBC NRBC COR # BLD: 10.28 10*3/MM3 (ref 4.8–10.8)
WBC NRBC COR # BLD: 10.5 10*3/MM3 (ref 4.8–10.8)
WBC NRBC COR # BLD: 11.33 10*3/MM3 (ref 4.8–10.8)
WBC NRBC COR # BLD: 16.15 10*3/MM3 (ref 4.8–10.8)
WBC NRBC COR # BLD: 5.79 10*3/MM3 (ref 4.8–10.8)
WBC NRBC COR # BLD: 5.91 10*3/MM3 (ref 4.8–10.8)
WBC NRBC COR # BLD: 6.02 10*3/MM3 (ref 4.8–10.8)
WBC NRBC COR # BLD: 6.75 10*3/MM3 (ref 4.8–10.8)
WBC NRBC COR # BLD: 6.81 10*3/MM3 (ref 4.8–10.8)
WBC NRBC COR # BLD: 7.3 10*3/MM3 (ref 4.8–10.8)
WBC NRBC COR # BLD: 7.75 10*3/MM3 (ref 4.8–10.8)
WBC NRBC COR # BLD: 7.76 10*3/MM3 (ref 4.8–10.8)
WBC NRBC COR # BLD: 8.78 10*3/MM3 (ref 4.8–10.8)
WBC UR QL AUTO: ABNORMAL /HPF
WHOLE BLOOD HOLD SPECIMEN: NORMAL
WHOLE BLOOD HOLD SPECIMEN: NORMAL

## 2017-01-01 PROCEDURE — 25010000002 PEMBROLIZUMAB 100 MG/4ML SOLUTION 4 ML VIAL: Performed by: INTERNAL MEDICINE

## 2017-01-01 PROCEDURE — 85025 COMPLETE CBC W/AUTO DIFF WBC: CPT | Performed by: FAMILY MEDICINE

## 2017-01-01 PROCEDURE — 85025 COMPLETE CBC W/AUTO DIFF WBC: CPT | Performed by: INTERNAL MEDICINE

## 2017-01-01 PROCEDURE — C1725 CATH, TRANSLUMIN NON-LASER: HCPCS | Performed by: INTERNAL MEDICINE

## 2017-01-01 PROCEDURE — 6360000004 HC RX CONTRAST MEDICATION: Performed by: NURSE PRACTITIONER

## 2017-01-01 PROCEDURE — 94799 UNLISTED PULMONARY SVC/PX: CPT

## 2017-01-01 PROCEDURE — 4040F PNEUMOC VAC/ADMIN/RCVD: CPT | Performed by: FAMILY MEDICINE

## 2017-01-01 PROCEDURE — 80048 BASIC METABOLIC PNL TOTAL CA: CPT | Performed by: NURSE PRACTITIONER

## 2017-01-01 PROCEDURE — 99214 OFFICE O/P EST MOD 30 MIN: CPT | Performed by: THORACIC SURGERY (CARDIOTHORACIC VASCULAR SURGERY)

## 2017-01-01 PROCEDURE — G8427 DOCREV CUR MEDS BY ELIG CLIN: HCPCS | Performed by: NURSE PRACTITIONER

## 2017-01-01 PROCEDURE — 99214 OFFICE O/P EST MOD 30 MIN: CPT | Performed by: INTERNAL MEDICINE

## 2017-01-01 PROCEDURE — 93321 DOPPLER ECHO F-UP/LMTD STD: CPT | Performed by: INTERNAL MEDICINE

## 2017-01-01 PROCEDURE — 3017F COLORECTAL CA SCREEN DOC REV: CPT | Performed by: FAMILY MEDICINE

## 2017-01-01 PROCEDURE — 99213 OFFICE O/P EST LOW 20 MIN: CPT | Performed by: NURSE PRACTITIONER

## 2017-01-01 PROCEDURE — 76775 US EXAM ABDO BACK WALL LIM: CPT

## 2017-01-01 PROCEDURE — 99232 SBSQ HOSP IP/OBS MODERATE 35: CPT | Performed by: INTERNAL MEDICINE

## 2017-01-01 PROCEDURE — 80053 COMPREHEN METABOLIC PANEL: CPT | Performed by: FAMILY MEDICINE

## 2017-01-01 PROCEDURE — 94729 DIFFUSING CAPACITY: CPT

## 2017-01-01 PROCEDURE — 94760 N-INVAS EAR/PLS OXIMETRY 1: CPT

## 2017-01-01 PROCEDURE — 1123F ACP DISCUSS/DSCN MKR DOCD: CPT | Performed by: NURSE PRACTITIONER

## 2017-01-01 PROCEDURE — 3017F COLORECTAL CA SCREEN DOC REV: CPT | Performed by: NURSE PRACTITIONER

## 2017-01-01 PROCEDURE — C1769 GUIDE WIRE: HCPCS | Performed by: INTERNAL MEDICINE

## 2017-01-01 PROCEDURE — 4040F PNEUMOC VAC/ADMIN/RCVD: CPT | Performed by: NURSE PRACTITIONER

## 2017-01-01 PROCEDURE — C1760 CLOSURE DEV, VASC: HCPCS | Performed by: INTERNAL MEDICINE

## 2017-01-01 PROCEDURE — 93571 IV DOP VEL&/PRESS C FLO 1ST: CPT | Performed by: INTERNAL MEDICINE

## 2017-01-01 PROCEDURE — 85027 COMPLETE CBC AUTOMATED: CPT | Performed by: INTERNAL MEDICINE

## 2017-01-01 PROCEDURE — 85025 COMPLETE CBC W/AUTO DIFF WBC: CPT | Performed by: NURSE PRACTITIONER

## 2017-01-01 PROCEDURE — 99214 OFFICE O/P EST MOD 30 MIN: CPT | Performed by: FAMILY MEDICINE

## 2017-01-01 PROCEDURE — 63710000001 INSULIN LISPRO (HUMAN) PER 5 UNITS: Performed by: INTERNAL MEDICINE

## 2017-01-01 PROCEDURE — G8979 MOBILITY GOAL STATUS: HCPCS

## 2017-01-01 PROCEDURE — G8417 CALC BMI ABV UP PARAM F/U: HCPCS | Performed by: NURSE PRACTITIONER

## 2017-01-01 PROCEDURE — 93325 DOPPLER ECHO COLOR FLOW MAPG: CPT | Performed by: INTERNAL MEDICINE

## 2017-01-01 PROCEDURE — 1123F ACP DISCUSS/DSCN MKR DOCD: CPT | Performed by: FAMILY MEDICINE

## 2017-01-01 PROCEDURE — G8427 DOCREV CUR MEDS BY ELIG CLIN: HCPCS | Performed by: FAMILY MEDICINE

## 2017-01-01 PROCEDURE — 25010000002 ENOXAPARIN PER 10 MG: Performed by: INTERNAL MEDICINE

## 2017-01-01 PROCEDURE — 97161 PT EVAL LOW COMPLEX 20 MIN: CPT

## 2017-01-01 PROCEDURE — 83880 ASSAY OF NATRIURETIC PEPTIDE: CPT | Performed by: EMERGENCY MEDICINE

## 2017-01-01 PROCEDURE — C1887 CATHETER, GUIDING: HCPCS | Performed by: INTERNAL MEDICINE

## 2017-01-01 PROCEDURE — 71270 CT THORAX DX C-/C+: CPT

## 2017-01-01 PROCEDURE — 93325 DOPPLER ECHO COLOR FLOW MAPG: CPT

## 2017-01-01 PROCEDURE — 85027 COMPLETE CBC AUTOMATED: CPT | Performed by: NURSE PRACTITIONER

## 2017-01-01 PROCEDURE — 25010000002 ENOXAPARIN PER 10 MG: Performed by: FAMILY MEDICINE

## 2017-01-01 PROCEDURE — 99222 1ST HOSP IP/OBS MODERATE 55: CPT | Performed by: INTERNAL MEDICINE

## 2017-01-01 PROCEDURE — 25010000002 ONDANSETRON PER 1 MG: Performed by: INTERNAL MEDICINE

## 2017-01-01 PROCEDURE — 80053 COMPREHEN METABOLIC PANEL: CPT | Performed by: INTERNAL MEDICINE

## 2017-01-01 PROCEDURE — C8924 2D TTE W OR W/O FOL W/CON,FU: HCPCS

## 2017-01-01 PROCEDURE — 96413 CHEMO IV INFUSION 1 HR: CPT

## 2017-01-01 PROCEDURE — 82803 BLOOD GASES ANY COMBINATION: CPT

## 2017-01-01 PROCEDURE — 99225 PR SBSQ OBSERVATION CARE/DAY 25 MINUTES: CPT | Performed by: INTERNAL MEDICINE

## 2017-01-01 PROCEDURE — 84484 ASSAY OF TROPONIN QUANT: CPT | Performed by: INTERNAL MEDICINE

## 2017-01-01 PROCEDURE — 93000 ELECTROCARDIOGRAM COMPLETE: CPT | Performed by: NURSE PRACTITIONER

## 2017-01-01 PROCEDURE — 83605 ASSAY OF LACTIC ACID: CPT | Performed by: FAMILY MEDICINE

## 2017-01-01 PROCEDURE — 25010000002 MEROPENEM: Performed by: INTERNAL MEDICINE

## 2017-01-01 PROCEDURE — 81003 URINALYSIS AUTO W/O SCOPE: CPT | Performed by: FAMILY MEDICINE

## 2017-01-01 PROCEDURE — 80048 BASIC METABOLIC PNL TOTAL CA: CPT | Performed by: FAMILY MEDICINE

## 2017-01-01 PROCEDURE — 1036F TOBACCO NON-USER: CPT | Performed by: FAMILY MEDICINE

## 2017-01-01 PROCEDURE — 25010000002 PERFLUTREN (DEFINITY) 8.476 MG IN SODIUM CHLORIDE 10 ML INJECTION: Performed by: INTERNAL MEDICINE

## 2017-01-01 PROCEDURE — P9016 RBC LEUKOCYTES REDUCED: HCPCS

## 2017-01-01 PROCEDURE — 82962 GLUCOSE BLOOD TEST: CPT

## 2017-01-01 PROCEDURE — 93880 EXTRACRANIAL BILAT STUDY: CPT | Performed by: SURGERY

## 2017-01-01 PROCEDURE — 87040 BLOOD CULTURE FOR BACTERIA: CPT | Performed by: INTERNAL MEDICINE

## 2017-01-01 PROCEDURE — 36415 COLL VENOUS BLD VENIPUNCTURE: CPT | Performed by: FAMILY MEDICINE

## 2017-01-01 PROCEDURE — 81001 URINALYSIS AUTO W/SCOPE: CPT | Performed by: FAMILY MEDICINE

## 2017-01-01 PROCEDURE — C1876 STENT, NON-COA/NON-COV W/DEL: HCPCS | Performed by: INTERNAL MEDICINE

## 2017-01-01 PROCEDURE — 84484 ASSAY OF TROPONIN QUANT: CPT

## 2017-01-01 PROCEDURE — 99214 OFFICE O/P EST MOD 30 MIN: CPT | Performed by: NURSE PRACTITIONER

## 2017-01-01 PROCEDURE — 97110 THERAPEUTIC EXERCISES: CPT

## 2017-01-01 PROCEDURE — 36600 WITHDRAWAL OF ARTERIAL BLOOD: CPT

## 2017-01-01 PROCEDURE — 71010 HC CHEST PA OR AP: CPT

## 2017-01-01 PROCEDURE — 1036F TOBACCO NON-USER: CPT | Performed by: NURSE PRACTITIONER

## 2017-01-01 PROCEDURE — 85007 BL SMEAR W/DIFF WBC COUNT: CPT | Performed by: FAMILY MEDICINE

## 2017-01-01 PROCEDURE — 99220 PR INITIAL OBSERVATION CARE/DAY 70 MINUTES: CPT | Performed by: NURSE PRACTITIONER

## 2017-01-01 PROCEDURE — 86900 BLOOD TYPING SEROLOGIC ABO: CPT

## 2017-01-01 PROCEDURE — 25010000002 FENTANYL CITRATE (PF) 100 MCG/2ML SOLUTION: Performed by: INTERNAL MEDICINE

## 2017-01-01 PROCEDURE — 25010000002 MIDAZOLAM PER 1 MG: Performed by: INTERNAL MEDICINE

## 2017-01-01 PROCEDURE — 25010000002 FUROSEMIDE PER 20 MG: Performed by: PHYSICIAN ASSISTANT

## 2017-01-01 PROCEDURE — 71020 XR CHEST STANDARD TWO VW: CPT

## 2017-01-01 PROCEDURE — 93010 ELECTROCARDIOGRAM REPORT: CPT | Performed by: INTERNAL MEDICINE

## 2017-01-01 PROCEDURE — 36415 COLL VENOUS BLD VENIPUNCTURE: CPT | Performed by: INTERNAL MEDICINE

## 2017-01-01 PROCEDURE — 86901 BLOOD TYPING SEROLOGIC RH(D): CPT | Performed by: PHYSICIAN ASSISTANT

## 2017-01-01 PROCEDURE — 93321 DOPPLER ECHO F-UP/LMTD STD: CPT

## 2017-01-01 PROCEDURE — 99215 OFFICE O/P EST HI 40 MIN: CPT | Performed by: INTERNAL MEDICINE

## 2017-01-01 PROCEDURE — 99285 EMERGENCY DEPT VISIT HI MDM: CPT

## 2017-01-01 PROCEDURE — 82550 ASSAY OF CK (CPK): CPT | Performed by: NURSE PRACTITIONER

## 2017-01-01 PROCEDURE — G8598 ASA/ANTIPLAT THER USED: HCPCS | Performed by: FAMILY MEDICINE

## 2017-01-01 PROCEDURE — 85007 BL SMEAR W/DIFF WBC COUNT: CPT | Performed by: NURSE PRACTITIONER

## 2017-01-01 PROCEDURE — 84484 ASSAY OF TROPONIN QUANT: CPT | Performed by: NURSE PRACTITIONER

## 2017-01-01 PROCEDURE — 25010000002 BIVALIRUDIN: Performed by: INTERNAL MEDICINE

## 2017-01-01 PROCEDURE — 86900 BLOOD TYPING SEROLOGIC ABO: CPT | Performed by: PHYSICIAN ASSISTANT

## 2017-01-01 PROCEDURE — 74177 CT ABD & PELVIS W/CONTRAST: CPT

## 2017-01-01 PROCEDURE — C8929 TTE W OR WO FOL WCON,DOPPLER: HCPCS

## 2017-01-01 PROCEDURE — 93880 EXTRACRANIAL BILAT STUDY: CPT

## 2017-01-01 PROCEDURE — 99233 SBSQ HOSP IP/OBS HIGH 50: CPT | Performed by: INTERNAL MEDICINE

## 2017-01-01 PROCEDURE — 82553 CREATINE MB FRACTION: CPT | Performed by: NURSE PRACTITIONER

## 2017-01-01 PROCEDURE — 93458 L HRT ARTERY/VENTRICLE ANGIO: CPT | Performed by: INTERNAL MEDICINE

## 2017-01-01 PROCEDURE — 97116 GAIT TRAINING THERAPY: CPT

## 2017-01-01 PROCEDURE — 82553 CREATINE MB FRACTION: CPT | Performed by: FAMILY MEDICINE

## 2017-01-01 PROCEDURE — 83880 ASSAY OF NATRIURETIC PEPTIDE: CPT | Performed by: FAMILY MEDICINE

## 2017-01-01 PROCEDURE — 93005 ELECTROCARDIOGRAM TRACING: CPT

## 2017-01-01 PROCEDURE — 94727 GAS DIL/WSHOT DETER LNG VOL: CPT

## 2017-01-01 PROCEDURE — 86850 RBC ANTIBODY SCREEN: CPT | Performed by: PHYSICIAN ASSISTANT

## 2017-01-01 PROCEDURE — 36430 TRANSFUSION BLD/BLD COMPNT: CPT

## 2017-01-01 PROCEDURE — 74176 CT ABD & PELVIS W/O CONTRAST: CPT

## 2017-01-01 PROCEDURE — 0 IOPAMIDOL 61 % SOLUTION: Performed by: INTERNAL MEDICINE

## 2017-01-01 PROCEDURE — G8419 CALC BMI OUT NRM PARAM NOF/U: HCPCS | Performed by: NURSE PRACTITIONER

## 2017-01-01 PROCEDURE — 93308 TTE F-UP OR LMTD: CPT | Performed by: INTERNAL MEDICINE

## 2017-01-01 PROCEDURE — G8484 FLU IMMUNIZE NO ADMIN: HCPCS | Performed by: NURSE PRACTITIONER

## 2017-01-01 PROCEDURE — 70450 CT HEAD/BRAIN W/O DYE: CPT

## 2017-01-01 PROCEDURE — G8417 CALC BMI ABV UP PARAM F/U: HCPCS | Performed by: FAMILY MEDICINE

## 2017-01-01 PROCEDURE — 94010 BREATHING CAPACITY TEST: CPT

## 2017-01-01 PROCEDURE — 83874 ASSAY OF MYOGLOBIN: CPT | Performed by: FAMILY MEDICINE

## 2017-01-01 PROCEDURE — 85379 FIBRIN DEGRADATION QUANT: CPT | Performed by: EMERGENCY MEDICINE

## 2017-01-01 PROCEDURE — 80053 COMPREHEN METABOLIC PANEL: CPT | Performed by: EMERGENCY MEDICINE

## 2017-01-01 PROCEDURE — 92920 PRQ TRLUML C ANGIOP 1ART&/BR: CPT | Performed by: INTERNAL MEDICINE

## 2017-01-01 PROCEDURE — 25010000002 DIPHENHYDRAMINE PER 50 MG: Performed by: INTERNAL MEDICINE

## 2017-01-01 PROCEDURE — 80048 BASIC METABOLIC PNL TOTAL CA: CPT | Performed by: INTERNAL MEDICINE

## 2017-01-01 PROCEDURE — 83874 ASSAY OF MYOGLOBIN: CPT | Performed by: NURSE PRACTITIONER

## 2017-01-01 PROCEDURE — 25010000002 ADENOSINE (DIAGNOSTIC) PER 30 MG: Performed by: INTERNAL MEDICINE

## 2017-01-01 PROCEDURE — C1894 INTRO/SHEATH, NON-LASER: HCPCS | Performed by: INTERNAL MEDICINE

## 2017-01-01 PROCEDURE — 86140 C-REACTIVE PROTEIN: CPT | Performed by: FAMILY MEDICINE

## 2017-01-01 PROCEDURE — 85610 PROTHROMBIN TIME: CPT | Performed by: INTERNAL MEDICINE

## 2017-01-01 PROCEDURE — 25010000002 HEPARIN FLUSH (PORCINE) 100 UNIT/ML SOLUTION: Performed by: INTERNAL MEDICINE

## 2017-01-01 PROCEDURE — 86920 COMPATIBILITY TEST SPIN: CPT

## 2017-01-01 PROCEDURE — 71260 CT THORAX DX C+: CPT

## 2017-01-01 PROCEDURE — G8978 MOBILITY CURRENT STATUS: HCPCS

## 2017-01-01 PROCEDURE — 63710000001 DIPHENHYDRAMINE PER 50 MG: Performed by: PHYSICIAN ASSISTANT

## 2017-01-01 PROCEDURE — 25010000002 FUROSEMIDE PER 20 MG: Performed by: FAMILY MEDICINE

## 2017-01-01 PROCEDURE — 94060 EVALUATION OF WHEEZING: CPT

## 2017-01-01 PROCEDURE — 0 IOPAMIDOL PER 1 ML: Performed by: NURSE PRACTITIONER

## 2017-01-01 PROCEDURE — 70470 CT HEAD/BRAIN W/O & W/DYE: CPT

## 2017-01-01 PROCEDURE — 82565 ASSAY OF CREATININE: CPT

## 2017-01-01 PROCEDURE — 25010000002 VANCOMYCIN: Performed by: INTERNAL MEDICINE

## 2017-01-01 PROCEDURE — 85025 COMPLETE CBC W/AUTO DIFF WBC: CPT | Performed by: EMERGENCY MEDICINE

## 2017-01-01 PROCEDURE — 94640 AIRWAY INHALATION TREATMENT: CPT

## 2017-01-01 PROCEDURE — 97530 THERAPEUTIC ACTIVITIES: CPT

## 2017-01-01 PROCEDURE — 93306 TTE W/DOPPLER COMPLETE: CPT | Performed by: INTERNAL MEDICINE

## 2017-01-01 DEVICE — IMPLANTABLE DEVICE: Type: IMPLANTABLE DEVICE | Status: FUNCTIONAL

## 2017-01-01 RX ORDER — LOSARTAN POTASSIUM 50 MG/1
100 TABLET ORAL DAILY
Status: DISCONTINUED | OUTPATIENT
Start: 2017-01-01 | End: 2017-01-01

## 2017-01-01 RX ORDER — DICLOFENAC SODIUM 75 MG/1
75 TABLET, DELAYED RELEASE ORAL EVERY 12 HOURS SCHEDULED
Status: DISCONTINUED | OUTPATIENT
Start: 2017-01-01 | End: 2017-01-01 | Stop reason: HOSPADM

## 2017-01-01 RX ORDER — ISOSORBIDE MONONITRATE 120 MG/1
120 TABLET, EXTENDED RELEASE ORAL 2 TIMES DAILY
Status: DISCONTINUED | OUTPATIENT
Start: 2017-01-01 | End: 2017-01-01 | Stop reason: HOSPADM

## 2017-01-01 RX ORDER — LEVOTHYROXINE SODIUM 0.12 MG/1
125 TABLET ORAL
Status: DISCONTINUED | OUTPATIENT
Start: 2017-01-01 | End: 2017-01-01 | Stop reason: HOSPADM

## 2017-01-01 RX ORDER — LEVOTHYROXINE SODIUM 0.1 MG/1
100 TABLET ORAL DAILY
Status: DISCONTINUED | OUTPATIENT
Start: 2017-01-01 | End: 2017-01-01 | Stop reason: HOSPADM

## 2017-01-01 RX ORDER — ASPIRIN 325 MG
325 TABLET ORAL DAILY
Status: DISCONTINUED | OUTPATIENT
Start: 2017-01-01 | End: 2017-01-01 | Stop reason: HOSPADM

## 2017-01-01 RX ORDER — CLOPIDOGREL BISULFATE 75 MG/1
600 TABLET ORAL ONCE
Status: DISCONTINUED | OUTPATIENT
Start: 2017-01-01 | End: 2017-01-01 | Stop reason: HOSPADM

## 2017-01-01 RX ORDER — SODIUM CHLORIDE 0.9 % (FLUSH) 0.9 %
1-10 SYRINGE (ML) INJECTION AS NEEDED
Status: DISCONTINUED | OUTPATIENT
Start: 2017-01-01 | End: 2017-01-01 | Stop reason: HOSPADM

## 2017-01-01 RX ORDER — ASPIRIN 81 MG/1
81 TABLET ORAL DAILY
Status: DISCONTINUED | OUTPATIENT
Start: 2017-01-01 | End: 2017-01-01 | Stop reason: HOSPADM

## 2017-01-01 RX ORDER — MULTIVIT,CALC,MINS/IRON/FOLIC 9MG-400MCG
1 TABLET ORAL DAILY
Status: DISCONTINUED | OUTPATIENT
Start: 2017-01-01 | End: 2017-01-01 | Stop reason: HOSPADM

## 2017-01-01 RX ORDER — NITROGLYCERIN 0.4 MG/1
TABLET SUBLINGUAL
Qty: 25 TABLET | Refills: 5 | Status: SHIPPED | OUTPATIENT
Start: 2017-01-01

## 2017-01-01 RX ORDER — ATORVASTATIN CALCIUM 10 MG/1
TABLET, FILM COATED ORAL
Qty: 90 TABLET | Refills: 1 | Status: SHIPPED | OUTPATIENT
Start: 2017-01-01

## 2017-01-01 RX ORDER — SODIUM CHLORIDE 9 MG/ML
3 INJECTION, SOLUTION INTRAVENOUS ONCE
Status: CANCELLED | OUTPATIENT
Start: 2017-01-01 | End: 2017-01-01

## 2017-01-01 RX ORDER — LOSARTAN POTASSIUM 100 MG/1
TABLET ORAL
Qty: 90 TABLET | Refills: 1 | Status: SHIPPED | OUTPATIENT
Start: 2017-01-01 | End: 2017-01-01

## 2017-01-01 RX ORDER — FEBUXOSTAT 80 MG/1
40 TABLET, FILM COATED ORAL DAILY
Status: DISCONTINUED | OUTPATIENT
Start: 2017-01-01 | End: 2017-01-01 | Stop reason: HOSPADM

## 2017-01-01 RX ORDER — CLOPIDOGREL BISULFATE 75 MG/1
75 TABLET ORAL DAILY
Qty: 90 TABLET | Refills: 3 | Status: SHIPPED | OUTPATIENT
Start: 2017-01-01

## 2017-01-01 RX ORDER — BISACODYL 10 MG
10 SUPPOSITORY, RECTAL RECTAL DAILY PRN
Status: DISCONTINUED | OUTPATIENT
Start: 2017-01-01 | End: 2017-01-01 | Stop reason: HOSPADM

## 2017-01-01 RX ORDER — GABAPENTIN 300 MG/1
300 CAPSULE ORAL 3 TIMES DAILY
Status: DISCONTINUED | OUTPATIENT
Start: 2017-01-01 | End: 2017-01-01 | Stop reason: HOSPADM

## 2017-01-01 RX ORDER — CLOPIDOGREL BISULFATE 75 MG/1
75 TABLET ORAL EVERY EVENING
Status: DISCONTINUED | OUTPATIENT
Start: 2017-01-01 | End: 2017-01-01 | Stop reason: HOSPADM

## 2017-01-01 RX ORDER — SODIUM CHLORIDE 0.9 % (FLUSH) 0.9 %
1-10 SYRINGE (ML) INJECTION AS NEEDED
Status: DISCONTINUED | OUTPATIENT
Start: 2017-01-01 | End: 2017-01-01

## 2017-01-01 RX ORDER — ATORVASTATIN CALCIUM 10 MG/1
10 TABLET, FILM COATED ORAL NIGHTLY
Status: DISCONTINUED | OUTPATIENT
Start: 2017-01-01 | End: 2017-01-01 | Stop reason: HOSPADM

## 2017-01-01 RX ORDER — CLOPIDOGREL BISULFATE 75 MG/1
75 TABLET ORAL DAILY
Status: DISCONTINUED | OUTPATIENT
Start: 2017-01-01 | End: 2017-01-01 | Stop reason: SDUPTHER

## 2017-01-01 RX ORDER — LEVOTHYROXINE SODIUM 0.12 MG/1
125 TABLET ORAL DAILY
COMMUNITY
Start: 2016-01-01

## 2017-01-01 RX ORDER — FENTANYL CITRATE 50 UG/ML
INJECTION, SOLUTION INTRAMUSCULAR; INTRAVENOUS AS NEEDED
Status: DISCONTINUED | OUTPATIENT
Start: 2017-01-01 | End: 2017-01-01 | Stop reason: HOSPADM

## 2017-01-01 RX ORDER — CLOPIDOGREL BISULFATE 75 MG/1
75 TABLET ORAL ONCE
Qty: 1 TABLET | Refills: 0 | Status: SHIPPED | OUTPATIENT
Start: 2017-01-01 | End: 2017-01-01

## 2017-01-01 RX ORDER — CLOPIDOGREL BISULFATE 75 MG/1
600 TABLET ORAL ONCE
Status: DISCONTINUED | OUTPATIENT
Start: 2017-01-01 | End: 2017-01-01 | Stop reason: SDUPTHER

## 2017-01-01 RX ORDER — VITAMIN E 268 MG
400 CAPSULE ORAL DAILY
Status: DISCONTINUED | OUTPATIENT
Start: 2017-01-01 | End: 2017-01-01 | Stop reason: HOSPADM

## 2017-01-01 RX ORDER — SODIUM CHLORIDE 0.9 % (FLUSH) 0.9 %
10 SYRINGE (ML) INJECTION AS NEEDED
Status: DISCONTINUED | OUTPATIENT
Start: 2017-01-01 | End: 2017-01-01 | Stop reason: HOSPADM

## 2017-01-01 RX ORDER — DIPHENHYDRAMINE HCL 25 MG
25 CAPSULE ORAL ONCE
Status: COMPLETED | OUTPATIENT
Start: 2017-01-01 | End: 2017-01-01

## 2017-01-01 RX ORDER — IPRATROPIUM BROMIDE AND ALBUTEROL SULFATE 2.5; .5 MG/3ML; MG/3ML
3 SOLUTION RESPIRATORY (INHALATION)
Status: DISCONTINUED | OUTPATIENT
Start: 2017-01-01 | End: 2017-01-01

## 2017-01-01 RX ORDER — IPRATROPIUM BROMIDE AND ALBUTEROL SULFATE 2.5; .5 MG/3ML; MG/3ML
3 SOLUTION RESPIRATORY (INHALATION)
Status: DISCONTINUED | OUTPATIENT
Start: 2017-01-01 | End: 2017-01-01 | Stop reason: HOSPADM

## 2017-01-01 RX ORDER — ACETAMINOPHEN 325 MG/1
650 TABLET ORAL EVERY 4 HOURS PRN
Status: DISCONTINUED | OUTPATIENT
Start: 2017-01-01 | End: 2017-01-01 | Stop reason: HOSPADM

## 2017-01-01 RX ORDER — CARVEDILOL 3.12 MG/1
3.12 TABLET ORAL 2 TIMES DAILY
Qty: 180 TABLET | Refills: 3 | Status: SHIPPED | OUTPATIENT
Start: 2017-01-01

## 2017-01-01 RX ORDER — LIDOCAINE HYDROCHLORIDE 20 MG/ML
INJECTION, SOLUTION INFILTRATION; PERINEURAL AS NEEDED
Status: DISCONTINUED | OUTPATIENT
Start: 2017-01-01 | End: 2017-01-01 | Stop reason: HOSPADM

## 2017-01-01 RX ORDER — AZITHROMYCIN 250 MG/1
TABLET, FILM COATED ORAL
Qty: 1 PACKET | Refills: 0 | Status: SHIPPED | OUTPATIENT
Start: 2017-01-01 | End: 2017-05-25

## 2017-01-01 RX ORDER — SODIUM CHLORIDE 0.9 % (FLUSH) 0.9 %
10 SYRINGE (ML) INJECTION AS NEEDED
Status: CANCELLED | OUTPATIENT
Start: 2017-01-01

## 2017-01-01 RX ORDER — RANOLAZINE 500 MG/1
1000 TABLET, EXTENDED RELEASE ORAL 2 TIMES DAILY
Status: DISCONTINUED | OUTPATIENT
Start: 2017-01-01 | End: 2017-01-01 | Stop reason: HOSPADM

## 2017-01-01 RX ORDER — SODIUM CHLORIDE 9 MG/ML
100 INJECTION, SOLUTION INTRAVENOUS CONTINUOUS
Status: DISCONTINUED | OUTPATIENT
Start: 2017-01-01 | End: 2017-01-01 | Stop reason: HOSPADM

## 2017-01-01 RX ORDER — CLOPIDOGREL BISULFATE 75 MG/1
75 TABLET ORAL DAILY
Status: DISCONTINUED | OUTPATIENT
Start: 2017-01-01 | End: 2017-01-01 | Stop reason: HOSPADM

## 2017-01-01 RX ORDER — ONDANSETRON 4 MG/1
4 TABLET, FILM COATED ORAL EVERY 6 HOURS PRN
Status: DISCONTINUED | OUTPATIENT
Start: 2017-01-01 | End: 2017-01-01 | Stop reason: HOSPADM

## 2017-01-01 RX ORDER — ASPIRIN 81 MG/1
81 TABLET, CHEWABLE ORAL DAILY
Status: DISCONTINUED | OUTPATIENT
Start: 2017-01-01 | End: 2017-01-01 | Stop reason: SDUPTHER

## 2017-01-01 RX ORDER — NEBULIZER
1 EACH MISCELLANEOUS EVERY 4 HOURS PRN
Qty: 100 EACH | Refills: 5 | Status: CANCELLED | OUTPATIENT
Start: 2017-01-01

## 2017-01-01 RX ORDER — MORPHINE SULFATE 2 MG/ML
1 INJECTION, SOLUTION INTRAMUSCULAR; INTRAVENOUS EVERY 4 HOURS PRN
Status: DISCONTINUED | OUTPATIENT
Start: 2017-01-01 | End: 2017-01-01 | Stop reason: HOSPADM

## 2017-01-01 RX ORDER — ATENOLOL 25 MG/1
25 TABLET ORAL DAILY
Status: DISCONTINUED | OUTPATIENT
Start: 2017-01-01 | End: 2017-01-01 | Stop reason: HOSPADM

## 2017-01-01 RX ORDER — SODIUM CHLORIDE 9 MG/ML
100 INJECTION, SOLUTION INTRAVENOUS CONTINUOUS
Status: DISCONTINUED | OUTPATIENT
Start: 2017-01-01 | End: 2017-01-01 | Stop reason: SDUPTHER

## 2017-01-01 RX ORDER — OXYCODONE AND ACETAMINOPHEN 7.5; 325 MG/1; MG/1
1 TABLET ORAL EVERY 4 HOURS
Status: DISCONTINUED | OUTPATIENT
Start: 2017-01-01 | End: 2017-01-01 | Stop reason: HOSPADM

## 2017-01-01 RX ORDER — CLOPIDOGREL BISULFATE 75 MG/1
TABLET ORAL AS NEEDED
Status: DISCONTINUED | OUTPATIENT
Start: 2017-01-01 | End: 2017-01-01 | Stop reason: HOSPADM

## 2017-01-01 RX ORDER — ONDANSETRON 2 MG/ML
4 INJECTION INTRAMUSCULAR; INTRAVENOUS EVERY 6 HOURS PRN
Status: DISCONTINUED | OUTPATIENT
Start: 2017-01-01 | End: 2017-01-01 | Stop reason: HOSPADM

## 2017-01-01 RX ORDER — SODIUM CHLORIDE 0.9 % (FLUSH) 0.9 %
10 SYRINGE (ML) INJECTION AS NEEDED
Status: DISCONTINUED | OUTPATIENT
Start: 2017-01-01 | End: 2017-01-01

## 2017-01-01 RX ORDER — DICLOFENAC SODIUM AND MISOPROSTOL 75; 200 MG/1; UG/1
1 TABLET, DELAYED RELEASE ORAL 2 TIMES DAILY
COMMUNITY

## 2017-01-01 RX ORDER — NICOTINE POLACRILEX 4 MG
15 LOZENGE BUCCAL
Status: DISCONTINUED | OUTPATIENT
Start: 2017-01-01 | End: 2017-01-01 | Stop reason: HOSPADM

## 2017-01-01 RX ORDER — RANOLAZINE 500 MG/1
1000 TABLET, EXTENDED RELEASE ORAL EVERY 12 HOURS SCHEDULED
Status: DISCONTINUED | OUTPATIENT
Start: 2017-01-01 | End: 2017-01-01 | Stop reason: HOSPADM

## 2017-01-01 RX ORDER — FENTANYL 25 UG/H
1 PATCH TRANSDERMAL
COMMUNITY

## 2017-01-01 RX ORDER — OXYCODONE AND ACETAMINOPHEN 7.5; 325 MG/1; MG/1
1 TABLET ORAL EVERY 4 HOURS PRN
Qty: 150 TABLET | Refills: 0 | Status: SHIPPED | OUTPATIENT
Start: 2017-01-01 | End: 2017-06-01

## 2017-01-01 RX ORDER — SODIUM CHLORIDE 9 MG/ML
250 INJECTION, SOLUTION INTRAVENOUS ONCE
Status: COMPLETED | OUTPATIENT
Start: 2017-01-01 | End: 2017-01-01

## 2017-01-01 RX ORDER — SODIUM CHLORIDE 0.9 % (FLUSH) 0.9 %
1-10 SYRINGE (ML) INJECTION AS NEEDED
Status: DISCONTINUED | OUTPATIENT
Start: 2017-01-01 | End: 2017-01-01 | Stop reason: SDUPTHER

## 2017-01-01 RX ORDER — CLOPIDOGREL BISULFATE 75 MG/1
75 TABLET ORAL DAILY
Qty: 30 TABLET | Refills: 0 | Status: SHIPPED | OUTPATIENT
Start: 2017-01-01 | End: 2017-01-01 | Stop reason: SDUPTHER

## 2017-01-01 RX ORDER — MISOPROSTOL 200 UG/1
200 TABLET ORAL EVERY 12 HOURS SCHEDULED
Status: DISCONTINUED | OUTPATIENT
Start: 2017-01-01 | End: 2017-01-01 | Stop reason: HOSPADM

## 2017-01-01 RX ORDER — FOLIC ACID 1 MG/1
1 TABLET ORAL DAILY
Status: DISCONTINUED | OUTPATIENT
Start: 2017-01-01 | End: 2017-01-01 | Stop reason: HOSPADM

## 2017-01-01 RX ORDER — NITROGLYCERIN 0.4 MG/1
TABLET SUBLINGUAL
COMMUNITY
Start: 2017-01-01 | End: 2017-01-01 | Stop reason: SDUPTHER

## 2017-01-01 RX ORDER — ALPRAZOLAM 0.25 MG/1
0.25 TABLET ORAL EVERY 4 HOURS PRN
Qty: 110 TABLET | Refills: 0 | Status: SHIPPED | OUTPATIENT
Start: 2017-01-01 | End: 2017-06-14

## 2017-01-01 RX ORDER — FEBUXOSTAT 40 MG/1
TABLET ORAL
Qty: 90 TABLET | Refills: 0 | Status: SHIPPED | OUTPATIENT
Start: 2017-01-01

## 2017-01-01 RX ORDER — AMLODIPINE BESYLATE 5 MG/1
5 TABLET ORAL DAILY
Qty: 30 TABLET | Refills: 11 | Status: SHIPPED | OUTPATIENT
Start: 2017-01-01 | End: 2017-01-01 | Stop reason: HOSPADM

## 2017-01-01 RX ORDER — ISOSORBIDE MONONITRATE 120 MG/1
240 TABLET, EXTENDED RELEASE ORAL
Status: DISCONTINUED | OUTPATIENT
Start: 2017-01-01 | End: 2017-01-01 | Stop reason: SDUPTHER

## 2017-01-01 RX ORDER — SODIUM CHLORIDE 0.9 % (FLUSH) 0.9 %
1-10 SYRINGE (ML) INJECTION AS NEEDED
Status: CANCELLED | OUTPATIENT
Start: 2017-01-01

## 2017-01-01 RX ORDER — CALCIUM CARBONATE 200(500)MG
1 TABLET,CHEWABLE ORAL 3 TIMES DAILY PRN
Status: DISCONTINUED | OUTPATIENT
Start: 2017-01-01 | End: 2017-01-01 | Stop reason: HOSPADM

## 2017-01-01 RX ORDER — ASPIRIN 81 MG/1
81 TABLET ORAL DAILY
Status: DISCONTINUED | OUTPATIENT
Start: 2017-01-01 | End: 2017-01-01 | Stop reason: SDUPTHER

## 2017-01-01 RX ORDER — SODIUM CHLORIDE 9 MG/ML
250 INJECTION, SOLUTION INTRAVENOUS ONCE
Status: CANCELLED | OUTPATIENT
Start: 2017-01-01

## 2017-01-01 RX ORDER — ALBUTEROL SULFATE 2.5 MG/3ML
2.5 SOLUTION RESPIRATORY (INHALATION) ONCE
Status: DISCONTINUED | OUTPATIENT
Start: 2017-01-01 | End: 2017-01-01 | Stop reason: HOSPADM

## 2017-01-01 RX ORDER — DIPHENHYDRAMINE HYDROCHLORIDE 50 MG/ML
INJECTION INTRAMUSCULAR; INTRAVENOUS AS NEEDED
Status: DISCONTINUED | OUTPATIENT
Start: 2017-01-01 | End: 2017-01-01 | Stop reason: HOSPADM

## 2017-01-01 RX ORDER — LOSARTAN POTASSIUM 100 MG/1
TABLET ORAL
Qty: 90 TABLET | Refills: 0 | Status: SHIPPED | OUTPATIENT
Start: 2017-01-01 | End: 2017-01-01 | Stop reason: SDUPTHER

## 2017-01-01 RX ORDER — PANTOPRAZOLE SODIUM 40 MG/1
40 TABLET, DELAYED RELEASE ORAL
Status: DISCONTINUED | OUTPATIENT
Start: 2017-01-01 | End: 2017-01-01 | Stop reason: HOSPADM

## 2017-01-01 RX ORDER — BISACODYL 10 MG
10 SUPPOSITORY, RECTAL RECTAL DAILY PRN
Refills: 0
Start: 2017-01-01

## 2017-01-01 RX ORDER — CHOLECALCIFEROL (VITAMIN D3) 125 MCG
1000 CAPSULE ORAL DAILY
Status: DISCONTINUED | OUTPATIENT
Start: 2017-01-01 | End: 2017-01-01 | Stop reason: HOSPADM

## 2017-01-01 RX ORDER — POLYETHYLENE GLYCOL 3350 17 G/17G
17 POWDER, FOR SOLUTION ORAL DAILY
Qty: 7 EACH
Start: 2017-01-01

## 2017-01-01 RX ORDER — FUROSEMIDE 10 MG/ML
20 INJECTION INTRAMUSCULAR; INTRAVENOUS ONCE
Status: COMPLETED | OUTPATIENT
Start: 2017-01-01 | End: 2017-01-01

## 2017-01-01 RX ORDER — MIDAZOLAM HYDROCHLORIDE 1 MG/ML
INJECTION INTRAMUSCULAR; INTRAVENOUS AS NEEDED
Status: DISCONTINUED | OUTPATIENT
Start: 2017-01-01 | End: 2017-01-01 | Stop reason: HOSPADM

## 2017-01-01 RX ORDER — GABAPENTIN 300 MG/1
300 CAPSULE ORAL 2 TIMES DAILY
Qty: 60 CAPSULE | Refills: 3 | Status: SHIPPED | OUTPATIENT
Start: 2017-01-01 | End: 2017-01-01 | Stop reason: SDUPTHER

## 2017-01-01 RX ORDER — PANTOPRAZOLE SODIUM 40 MG/1
40 TABLET, DELAYED RELEASE ORAL DAILY
Qty: 30 TABLET | Refills: 2 | Status: SHIPPED | OUTPATIENT
Start: 2017-01-01

## 2017-01-01 RX ORDER — MAGNESIUM HYDROXIDE/ALUMINUM HYDROXICE/SIMETHICONE 120; 1200; 1200 MG/30ML; MG/30ML; MG/30ML
10 SUSPENSION ORAL EVERY 6 HOURS PRN
Status: DISCONTINUED | OUTPATIENT
Start: 2017-01-01 | End: 2017-01-01 | Stop reason: HOSPADM

## 2017-01-01 RX ORDER — SENNA AND DOCUSATE SODIUM 50; 8.6 MG/1; MG/1
2 TABLET, FILM COATED ORAL 2 TIMES DAILY
Status: DISCONTINUED | OUTPATIENT
Start: 2017-01-01 | End: 2017-01-01 | Stop reason: HOSPADM

## 2017-01-01 RX ORDER — POLYETHYLENE GLYCOL 3350 17 G/17G
17 POWDER, FOR SOLUTION ORAL DAILY
Status: DISCONTINUED | OUTPATIENT
Start: 2017-01-01 | End: 2017-01-01 | Stop reason: HOSPADM

## 2017-01-01 RX ORDER — DESVENLAFAXINE 50 MG/1
50 TABLET, EXTENDED RELEASE ORAL DAILY
Qty: 30 TABLET | Refills: 5 | Status: SHIPPED | OUTPATIENT
Start: 2017-01-01

## 2017-01-01 RX ORDER — SODIUM CHLORIDE 9 MG/ML
3 INJECTION, SOLUTION INTRAVENOUS ONCE
Status: COMPLETED | OUTPATIENT
Start: 2017-01-01 | End: 2017-01-01

## 2017-01-01 RX ORDER — OXYCODONE AND ACETAMINOPHEN 7.5; 325 MG/1; MG/1
TABLET ORAL EVERY 4 HOURS
COMMUNITY
Start: 2017-01-01 | End: 2017-06-01

## 2017-01-01 RX ORDER — FUROSEMIDE 10 MG/ML
40 INJECTION INTRAMUSCULAR; INTRAVENOUS 2 TIMES DAILY
Status: DISCONTINUED | OUTPATIENT
Start: 2017-01-01 | End: 2017-01-01

## 2017-01-01 RX ORDER — NITROGLYCERIN 0.4 MG/1
TABLET SUBLINGUAL
Qty: 100 TABLET | Refills: 11 | Status: SHIPPED | OUTPATIENT
Start: 2017-01-01

## 2017-01-01 RX ORDER — ATENOLOL 25 MG/1
25 TABLET ORAL
Status: DISCONTINUED | OUTPATIENT
Start: 2017-01-01 | End: 2017-01-01

## 2017-01-01 RX ORDER — ALBUTEROL SULFATE 2.5 MG/3ML
2.5 SOLUTION RESPIRATORY (INHALATION) ONCE
Status: COMPLETED | OUTPATIENT
Start: 2017-01-01 | End: 2017-01-01

## 2017-01-01 RX ORDER — ONDANSETRON 4 MG/1
4 TABLET, ORALLY DISINTEGRATING ORAL EVERY 6 HOURS PRN
Qty: 40 TABLET | Refills: 0 | Status: SHIPPED | OUTPATIENT
Start: 2017-01-01

## 2017-01-01 RX ORDER — LOSARTAN POTASSIUM 50 MG/1
100 TABLET ORAL DAILY
Status: DISCONTINUED | OUTPATIENT
Start: 2017-01-01 | End: 2017-01-01 | Stop reason: HOSPADM

## 2017-01-01 RX ORDER — ATENOLOL 25 MG/1
TABLET ORAL
Qty: 90 TABLET | Refills: 3 | Status: SHIPPED | OUTPATIENT
Start: 2017-01-01 | End: 2017-01-01 | Stop reason: HOSPADM

## 2017-01-01 RX ORDER — DEXTROSE MONOHYDRATE 25 G/50ML
25 INJECTION, SOLUTION INTRAVENOUS
Status: DISCONTINUED | OUTPATIENT
Start: 2017-01-01 | End: 2017-01-01 | Stop reason: HOSPADM

## 2017-01-01 RX ORDER — ISOSORBIDE MONONITRATE 120 MG/1
120 TABLET, EXTENDED RELEASE ORAL 2 TIMES DAILY
Qty: 180 TABLET | Refills: 3 | Status: SHIPPED | OUTPATIENT
Start: 2017-01-01

## 2017-01-01 RX ORDER — GABAPENTIN 300 MG/1
300 CAPSULE ORAL 2 TIMES DAILY
Qty: 180 CAPSULE | Refills: 0 | Status: SHIPPED | OUTPATIENT
Start: 2017-01-01 | End: 2017-01-01

## 2017-01-01 RX ORDER — ACETAMINOPHEN 325 MG/1
650 TABLET ORAL ONCE
Status: COMPLETED | OUTPATIENT
Start: 2017-01-01 | End: 2017-01-01

## 2017-01-01 RX ORDER — CLOPIDOGREL BISULFATE 75 MG/1
75 TABLET ORAL DAILY
COMMUNITY
Start: 2017-01-01

## 2017-01-01 RX ADMIN — OXYCODONE HYDROCHLORIDE AND ACETAMINOPHEN 1 TABLET: 7.5; 325 TABLET ORAL at 04:51

## 2017-01-01 RX ADMIN — FOLIC ACID 1 MG: 1 TABLET ORAL at 08:27

## 2017-01-01 RX ADMIN — ONDANSETRON 4 MG: 4 TABLET, FILM COATED ORAL at 08:59

## 2017-01-01 RX ADMIN — FUROSEMIDE 40 MG: 10 INJECTION, SOLUTION INTRAMUSCULAR; INTRAVENOUS at 11:21

## 2017-01-01 RX ADMIN — Medication 10 ML: at 11:29

## 2017-01-01 RX ADMIN — ATORVASTATIN CALCIUM 10 MG: 10 TABLET, FILM COATED ORAL at 22:17

## 2017-01-01 RX ADMIN — SODIUM CHLORIDE 100 ML/HR: 9 INJECTION, SOLUTION INTRAVENOUS at 22:17

## 2017-01-01 RX ADMIN — IOPAMIDOL 100 ML: 612 INJECTION, SOLUTION INTRAVENOUS at 09:15

## 2017-01-01 RX ADMIN — RANOLAZINE 1000 MG: 500 TABLET, FILM COATED, EXTENDED RELEASE ORAL at 09:08

## 2017-01-01 RX ADMIN — ACETAMINOPHEN 650 MG: 325 TABLET, FILM COATED ORAL at 15:29

## 2017-01-01 RX ADMIN — INSULIN LISPRO 2 UNITS: 100 INJECTION, SOLUTION INTRAVENOUS; SUBCUTANEOUS at 09:10

## 2017-01-01 RX ADMIN — ONDANSETRON 4 MG: 4 TABLET, FILM COATED ORAL at 15:21

## 2017-01-01 RX ADMIN — CLOPIDOGREL BISULFATE 75 MG: 75 TABLET, FILM COATED ORAL at 09:43

## 2017-01-01 RX ADMIN — ASPIRIN 81 MG: 81 TABLET ORAL at 09:08

## 2017-01-01 RX ADMIN — CLOPIDOGREL BISULFATE 75 MG: 75 TABLET, FILM COATED ORAL at 08:37

## 2017-01-01 RX ADMIN — ALBUTEROL SULFATE 2.5 MG: 2.5 SOLUTION RESPIRATORY (INHALATION) at 10:11

## 2017-01-01 RX ADMIN — MEROPENEM 1 G: 1 INJECTION, POWDER, FOR SOLUTION INTRAVENOUS at 04:50

## 2017-01-01 RX ADMIN — POLYETHYLENE GLYCOL (3350) 17 G: 17 POWDER, FOR SOLUTION ORAL at 15:06

## 2017-01-01 RX ADMIN — ASPIRIN 81 MG: 81 TABLET ORAL at 17:48

## 2017-01-01 RX ADMIN — LEVOTHYROXINE SODIUM 125 MCG: 0.12 TABLET ORAL at 06:18

## 2017-01-01 RX ADMIN — OXYCODONE HYDROCHLORIDE AND ACETAMINOPHEN 1 TABLET: 7.5; 325 TABLET ORAL at 05:38

## 2017-01-01 RX ADMIN — PANTOPRAZOLE SODIUM 40 MG: 40 TABLET, DELAYED RELEASE ORAL at 05:38

## 2017-01-01 RX ADMIN — VITAMIN E CAP 400 UNIT 400 UNITS: 400 CAP at 08:43

## 2017-01-01 RX ADMIN — MISOPROSTOL 200 MCG: 200 TABLET ORAL at 22:38

## 2017-01-01 RX ADMIN — ASPIRIN 81 MG: 81 TABLET ORAL at 08:56

## 2017-01-01 RX ADMIN — Medication 1000 MCG: at 09:07

## 2017-01-01 RX ADMIN — ASPIRIN 81 MG: 81 TABLET ORAL at 08:24

## 2017-01-01 RX ADMIN — ATORVASTATIN CALCIUM 10 MG: 10 TABLET, FILM COATED ORAL at 20:28

## 2017-01-01 RX ADMIN — ENOXAPARIN SODIUM 40 MG: 40 INJECTION SUBCUTANEOUS at 09:41

## 2017-01-01 RX ADMIN — GABAPENTIN 300 MG: 300 CAPSULE ORAL at 08:38

## 2017-01-01 RX ADMIN — CLOPIDOGREL BISULFATE 75 MG: 75 TABLET, FILM COATED ORAL at 08:27

## 2017-01-01 RX ADMIN — VITAMIN E CAP 400 UNIT 400 UNITS: 400 CAP at 09:08

## 2017-01-01 RX ADMIN — Medication 1 TABLET: at 08:43

## 2017-01-01 RX ADMIN — GABAPENTIN 300 MG: 300 CAPSULE ORAL at 17:32

## 2017-01-01 RX ADMIN — IPRATROPIUM BROMIDE AND ALBUTEROL SULFATE 3 ML: .5; 3 SOLUTION RESPIRATORY (INHALATION) at 19:27

## 2017-01-01 RX ADMIN — ISOSORBIDE MONONITRATE 120 MG: 120 TABLET, EXTENDED RELEASE ORAL at 09:43

## 2017-01-01 RX ADMIN — SODIUM CHLORIDE 100 ML/HR: 9 INJECTION, SOLUTION INTRAVENOUS at 09:57

## 2017-01-01 RX ADMIN — LEVOTHYROXINE SODIUM 125 MCG: 0.12 TABLET ORAL at 06:41

## 2017-01-01 RX ADMIN — IPRATROPIUM BROMIDE AND ALBUTEROL SULFATE 3 ML: 2.5; .5 SOLUTION RESPIRATORY (INHALATION) at 16:11

## 2017-01-01 RX ADMIN — IPRATROPIUM BROMIDE AND ALBUTEROL SULFATE 3 ML: .5; 3 SOLUTION RESPIRATORY (INHALATION) at 15:06

## 2017-01-01 RX ADMIN — ONDANSETRON 4 MG: 2 INJECTION INTRAMUSCULAR; INTRAVENOUS at 15:31

## 2017-01-01 RX ADMIN — Medication 1000 MCG: at 08:27

## 2017-01-01 RX ADMIN — RANOLAZINE 1000 MG: 500 TABLET, FILM COATED, EXTENDED RELEASE ORAL at 09:42

## 2017-01-01 RX ADMIN — VITAMIN E CAP 400 UNIT 400 UNITS: 400 CAP at 08:37

## 2017-01-01 RX ADMIN — ATORVASTATIN CALCIUM 10 MG: 10 TABLET, FILM COATED ORAL at 20:19

## 2017-01-01 RX ADMIN — CLOPIDOGREL BISULFATE 75 MG: 75 TABLET, FILM COATED ORAL at 09:08

## 2017-01-01 RX ADMIN — IPRATROPIUM BROMIDE AND ALBUTEROL SULFATE 3 ML: 2.5; .5 SOLUTION RESPIRATORY (INHALATION) at 19:58

## 2017-01-01 RX ADMIN — GABAPENTIN 300 MG: 300 CAPSULE ORAL at 15:30

## 2017-01-01 RX ADMIN — OXYCODONE HYDROCHLORIDE AND ACETAMINOPHEN 1 TABLET: 7.5; 325 TABLET ORAL at 08:52

## 2017-01-01 RX ADMIN — ISOSORBIDE MONONITRATE 120 MG: 120 TABLET, EXTENDED RELEASE ORAL at 09:08

## 2017-01-01 RX ADMIN — ASPIRIN 81 MG: 81 TABLET ORAL at 08:27

## 2017-01-01 RX ADMIN — OXYCODONE HYDROCHLORIDE AND ACETAMINOPHEN 1 TABLET: 7.5; 325 TABLET ORAL at 09:41

## 2017-01-01 RX ADMIN — INSULIN LISPRO 3 UNITS: 100 INJECTION, SOLUTION INTRAVENOUS; SUBCUTANEOUS at 17:36

## 2017-01-01 RX ADMIN — INSULIN LISPRO 2 UNITS: 100 INJECTION, SOLUTION INTRAVENOUS; SUBCUTANEOUS at 20:28

## 2017-01-01 RX ADMIN — SODIUM CHLORIDE 5 ML: 9 INJECTION INTRAMUSCULAR; INTRAVENOUS; SUBCUTANEOUS at 14:57

## 2017-01-01 RX ADMIN — GABAPENTIN 300 MG: 300 CAPSULE ORAL at 22:17

## 2017-01-01 RX ADMIN — GABAPENTIN 300 MG: 300 CAPSULE ORAL at 08:43

## 2017-01-01 RX ADMIN — ONDANSETRON 4 MG: 2 INJECTION INTRAMUSCULAR; INTRAVENOUS at 09:37

## 2017-01-01 RX ADMIN — ATENOLOL 25 MG: 25 TABLET ORAL at 08:56

## 2017-01-01 RX ADMIN — ISOSORBIDE MONONITRATE 120 MG: 120 TABLET, EXTENDED RELEASE ORAL at 08:43

## 2017-01-01 RX ADMIN — Medication 500 UNITS: at 13:43

## 2017-01-01 RX ADMIN — IPRATROPIUM BROMIDE AND ALBUTEROL SULFATE 3 ML: 2.5; .5 SOLUTION RESPIRATORY (INHALATION) at 13:48

## 2017-01-01 RX ADMIN — ENOXAPARIN SODIUM 40 MG: 40 INJECTION SUBCUTANEOUS at 09:10

## 2017-01-01 RX ADMIN — IPRATROPIUM BROMIDE AND ALBUTEROL SULFATE 3 ML: .5; 3 SOLUTION RESPIRATORY (INHALATION) at 04:08

## 2017-01-01 RX ADMIN — FOLIC ACID 1 MG: 1 TABLET ORAL at 09:41

## 2017-01-01 RX ADMIN — CLOPIDOGREL BISULFATE 75 MG: 75 TABLET, FILM COATED ORAL at 17:48

## 2017-01-01 RX ADMIN — ACETAMINOPHEN 650 MG: 325 TABLET ORAL at 17:49

## 2017-01-01 RX ADMIN — ALUMINUM HYDROXIDE, MAGNESIUM HYDROXIDE, AND SIMETHICONE 10 ML: 200; 200; 20 SUSPENSION ORAL at 11:41

## 2017-01-01 RX ADMIN — GABAPENTIN 300 MG: 300 CAPSULE ORAL at 09:43

## 2017-01-01 RX ADMIN — SODIUM CHLORIDE 4 ML: 9 INJECTION INTRAMUSCULAR; INTRAVENOUS; SUBCUTANEOUS at 14:46

## 2017-01-01 RX ADMIN — IPRATROPIUM BROMIDE AND ALBUTEROL SULFATE 3 ML: 2.5; .5 SOLUTION RESPIRATORY (INHALATION) at 07:16

## 2017-01-01 RX ADMIN — ONDANSETRON 4 MG: 4 TABLET, FILM COATED ORAL at 08:25

## 2017-01-01 RX ADMIN — ISOSORBIDE MONONITRATE 120 MG: 120 TABLET, EXTENDED RELEASE ORAL at 17:36

## 2017-01-01 RX ADMIN — Medication 1 TABLET: at 09:41

## 2017-01-01 RX ADMIN — IPRATROPIUM BROMIDE AND ALBUTEROL SULFATE 3 ML: .5; 3 SOLUTION RESPIRATORY (INHALATION) at 11:28

## 2017-01-01 RX ADMIN — GABAPENTIN 300 MG: 300 CAPSULE ORAL at 08:28

## 2017-01-01 RX ADMIN — DOCUSATE SODIUM -SENNOSIDES 2 TABLET: 50; 8.6 TABLET, COATED ORAL at 17:48

## 2017-01-01 RX ADMIN — OXYCODONE HYDROCHLORIDE AND ACETAMINOPHEN 1 TABLET: 7.5; 325 TABLET ORAL at 20:28

## 2017-01-01 RX ADMIN — RANOLAZINE 1000 MG: 500 TABLET, FILM COATED, EXTENDED RELEASE ORAL at 20:58

## 2017-01-01 RX ADMIN — IPRATROPIUM BROMIDE AND ALBUTEROL SULFATE 3 ML: .5; 3 SOLUTION RESPIRATORY (INHALATION) at 06:39

## 2017-01-01 RX ADMIN — LEVOTHYROXINE SODIUM 100 MCG: 100 TABLET ORAL at 09:45

## 2017-01-01 RX ADMIN — FOLIC ACID 1 MG: 1 TABLET ORAL at 09:08

## 2017-01-01 RX ADMIN — ISOSORBIDE MONONITRATE 120 MG: 120 TABLET, EXTENDED RELEASE ORAL at 18:52

## 2017-01-01 RX ADMIN — SODIUM CHLORIDE 100 ML/HR: 9 INJECTION, SOLUTION INTRAVENOUS at 07:00

## 2017-01-01 RX ADMIN — CLOPIDOGREL BISULFATE 75 MG: 75 TABLET, FILM COATED ORAL at 08:43

## 2017-01-01 RX ADMIN — ISOSORBIDE MONONITRATE 120 MG: 120 TABLET, EXTENDED RELEASE ORAL at 08:38

## 2017-01-01 RX ADMIN — LOSARTAN POTASSIUM 100 MG: 50 TABLET, FILM COATED ORAL at 22:17

## 2017-01-01 RX ADMIN — ENOXAPARIN SODIUM 40 MG: 40 INJECTION SUBCUTANEOUS at 08:51

## 2017-01-01 RX ADMIN — GABAPENTIN 300 MG: 300 CAPSULE ORAL at 20:58

## 2017-01-01 RX ADMIN — FOLIC ACID 1 MG: 1 TABLET ORAL at 08:37

## 2017-01-01 RX ADMIN — ONDANSETRON 4 MG: 2 INJECTION INTRAMUSCULAR; INTRAVENOUS at 14:07

## 2017-01-01 RX ADMIN — DICLOFENAC SODIUM 75 MG: 75 TABLET, DELAYED RELEASE ORAL at 22:38

## 2017-01-01 RX ADMIN — ENOXAPARIN SODIUM 40 MG: 40 INJECTION SUBCUTANEOUS at 08:28

## 2017-01-01 RX ADMIN — VITAMIN E CAP 400 UNIT 400 UNITS: 400 CAP at 08:27

## 2017-01-01 RX ADMIN — LEVOTHYROXINE SODIUM 100 MCG: 100 TABLET ORAL at 08:27

## 2017-01-01 RX ADMIN — FEBUXOSTAT 40 MG: 80 TABLET ORAL at 08:27

## 2017-01-01 RX ADMIN — GABAPENTIN 300 MG: 300 CAPSULE ORAL at 17:36

## 2017-01-01 RX ADMIN — IOVERSOL 90 ML: 636 INJECTION INTRA-ARTERIAL; INTRAVENOUS at 13:24

## 2017-01-01 RX ADMIN — ASPIRIN 325 MG: 325 TABLET, COATED ORAL at 08:37

## 2017-01-01 RX ADMIN — ASPIRIN 81 MG: 81 TABLET ORAL at 08:43

## 2017-01-01 RX ADMIN — SODIUM CHLORIDE 205 MG: 9 INJECTION, SOLUTION INTRAVENOUS at 12:56

## 2017-01-01 RX ADMIN — IPRATROPIUM BROMIDE AND ALBUTEROL SULFATE 3 ML: .5; 3 SOLUTION RESPIRATORY (INHALATION) at 07:07

## 2017-01-01 RX ADMIN — LEVOTHYROXINE SODIUM 125 MCG: 0.12 TABLET ORAL at 06:22

## 2017-01-01 RX ADMIN — RANOLAZINE 1000 MG: 500 TABLET, FILM COATED, EXTENDED RELEASE ORAL at 08:27

## 2017-01-01 RX ADMIN — IPRATROPIUM BROMIDE AND ALBUTEROL SULFATE 3 ML: 2.5; .5 SOLUTION RESPIRATORY (INHALATION) at 06:55

## 2017-01-01 RX ADMIN — ATORVASTATIN CALCIUM 10 MG: 10 TABLET, FILM COATED ORAL at 21:46

## 2017-01-01 RX ADMIN — SODIUM CHLORIDE 3 ML/KG/HR: 9 INJECTION, SOLUTION INTRAVENOUS at 14:19

## 2017-01-01 RX ADMIN — FEBUXOSTAT 40 MG: 80 TABLET ORAL at 09:43

## 2017-01-01 RX ADMIN — ISOSORBIDE MONONITRATE 120 MG: 120 TABLET, EXTENDED RELEASE ORAL at 22:17

## 2017-01-01 RX ADMIN — Medication 1 TABLET: at 08:27

## 2017-01-01 RX ADMIN — FUROSEMIDE 20 MG: 10 INJECTION, SOLUTION INTRAMUSCULAR; INTRAVENOUS at 01:36

## 2017-01-01 RX ADMIN — INSULIN LISPRO 2 UNITS: 100 INJECTION, SOLUTION INTRAVENOUS; SUBCUTANEOUS at 11:21

## 2017-01-01 RX ADMIN — DIPHENHYDRAMINE HYDROCHLORIDE 25 MG: 25 CAPSULE ORAL at 15:29

## 2017-01-01 RX ADMIN — Medication 1 TABLET: at 09:08

## 2017-01-01 RX ADMIN — OXYCODONE HYDROCHLORIDE AND ACETAMINOPHEN 1 TABLET: 7.5; 325 TABLET ORAL at 20:58

## 2017-01-01 RX ADMIN — RANOLAZINE 1000 MG: 500 TABLET, FILM COATED, EXTENDED RELEASE ORAL at 20:28

## 2017-01-01 RX ADMIN — LEVOTHYROXINE SODIUM 100 MCG: 100 TABLET ORAL at 08:43

## 2017-01-01 RX ADMIN — CLOPIDOGREL BISULFATE 75 MG: 75 TABLET, FILM COATED ORAL at 17:49

## 2017-01-01 RX ADMIN — GABAPENTIN 300 MG: 300 CAPSULE ORAL at 20:37

## 2017-01-01 RX ADMIN — SODIUM CHLORIDE 250 ML: 9 INJECTION, SOLUTION INTRAVENOUS at 12:30

## 2017-01-01 RX ADMIN — ISOSORBIDE MONONITRATE 120 MG: 120 TABLET, EXTENDED RELEASE ORAL at 08:28

## 2017-01-01 RX ADMIN — FUROSEMIDE 20 MG: 10 INJECTION, SOLUTION INTRAMUSCULAR; INTRAVENOUS at 15:06

## 2017-01-01 RX ADMIN — INSULIN LISPRO 2 UNITS: 100 INJECTION, SOLUTION INTRAVENOUS; SUBCUTANEOUS at 12:18

## 2017-01-01 RX ADMIN — RANOLAZINE 1000 MG: 500 TABLET, FILM COATED, EXTENDED RELEASE ORAL at 08:37

## 2017-01-01 RX ADMIN — SODIUM CHLORIDE, PRESERVATIVE FREE 10 ML: 5 INJECTION INTRAVENOUS at 13:42

## 2017-01-01 RX ADMIN — ATENOLOL 25 MG: 25 TABLET ORAL at 08:37

## 2017-01-01 RX ADMIN — GABAPENTIN 300 MG: 300 CAPSULE ORAL at 09:08

## 2017-01-01 RX ADMIN — ASPIRIN 81 MG: 81 TABLET ORAL at 08:13

## 2017-01-01 RX ADMIN — IPRATROPIUM BROMIDE AND ALBUTEROL SULFATE 3 ML: 2.5; .5 SOLUTION RESPIRATORY (INHALATION) at 10:47

## 2017-01-01 RX ADMIN — ISOSORBIDE MONONITRATE 120 MG: 120 TABLET, EXTENDED RELEASE ORAL at 17:32

## 2017-01-01 RX ADMIN — RANOLAZINE 1000 MG: 500 TABLET, FILM COATED, EXTENDED RELEASE ORAL at 20:37

## 2017-01-01 RX ADMIN — SODIUM CHLORIDE 100 ML/HR: 9 INJECTION, SOLUTION INTRAVENOUS at 02:51

## 2017-01-01 RX ADMIN — SODIUM CHLORIDE 100 ML/HR: 9 INJECTION, SOLUTION INTRAVENOUS at 23:18

## 2017-01-01 RX ADMIN — DICLOFENAC SODIUM 75 MG: 75 TABLET, DELAYED RELEASE ORAL at 08:36

## 2017-01-01 RX ADMIN — ENOXAPARIN SODIUM 30 MG: 30 INJECTION SUBCUTANEOUS at 17:47

## 2017-01-01 RX ADMIN — IPRATROPIUM BROMIDE AND ALBUTEROL SULFATE 3 ML: 2.5; .5 SOLUTION RESPIRATORY (INHALATION) at 10:29

## 2017-01-01 RX ADMIN — Medication 1000 MCG: at 09:42

## 2017-01-01 RX ADMIN — MISOPROSTOL 200 MCG: 200 TABLET ORAL at 08:37

## 2017-01-01 RX ADMIN — ASPIRIN 325 MG: 325 TABLET, COATED ORAL at 14:19

## 2017-01-01 RX ADMIN — FEBUXOSTAT 40 MG: 80 TABLET ORAL at 09:08

## 2017-01-01 RX ADMIN — OXYCODONE HYDROCHLORIDE AND ACETAMINOPHEN 1 TABLET: 7.5; 325 TABLET ORAL at 15:30

## 2017-01-01 RX ADMIN — DOCUSATE SODIUM -SENNOSIDES 2 TABLET: 50; 8.6 TABLET, COATED ORAL at 17:38

## 2017-01-01 RX ADMIN — FEBUXOSTAT 40 MG: 80 TABLET ORAL at 08:43

## 2017-01-01 RX ADMIN — OXYCODONE HYDROCHLORIDE AND ACETAMINOPHEN 1 TABLET: 7.5; 325 TABLET ORAL at 20:37

## 2017-01-01 RX ADMIN — SODIUM CHLORIDE 100 ML/HR: 9 INJECTION, SOLUTION INTRAVENOUS at 20:36

## 2017-01-01 RX ADMIN — Medication 1000 MCG: at 08:37

## 2017-01-01 RX ADMIN — SODIUM CHLORIDE 100 ML/HR: 9 INJECTION, SOLUTION INTRAVENOUS at 16:45

## 2017-01-01 RX ADMIN — IOPAMIDOL 100 ML: 755 INJECTION, SOLUTION INTRAVENOUS at 09:15

## 2017-01-01 RX ADMIN — ATORVASTATIN CALCIUM 10 MG: 10 TABLET, FILM COATED ORAL at 20:36

## 2017-01-01 RX ADMIN — CLOPIDOGREL BISULFATE 75 MG: 75 TABLET, FILM COATED ORAL at 16:20

## 2017-01-01 RX ADMIN — VANCOMYCIN HYDROCHLORIDE 1500 MG: 1 INJECTION, POWDER, LYOPHILIZED, FOR SOLUTION INTRAVENOUS at 04:54

## 2017-01-01 RX ADMIN — DOCUSATE SODIUM -SENNOSIDES 2 TABLET: 50; 8.6 TABLET, COATED ORAL at 08:24

## 2017-01-01 RX ADMIN — RANOLAZINE 1000 MG: 500 TABLET, FILM COATED, EXTENDED RELEASE ORAL at 22:17

## 2017-01-01 RX ADMIN — IPRATROPIUM BROMIDE AND ALBUTEROL SULFATE 3 ML: .5; 3 SOLUTION RESPIRATORY (INHALATION) at 23:38

## 2017-01-01 RX ADMIN — ENOXAPARIN SODIUM 30 MG: 30 INJECTION SUBCUTANEOUS at 16:20

## 2017-01-01 RX ADMIN — IPRATROPIUM BROMIDE AND ALBUTEROL SULFATE 3 ML: 2.5; .5 SOLUTION RESPIRATORY (INHALATION) at 22:42

## 2017-01-01 RX ADMIN — ATENOLOL 25 MG: 25 TABLET ORAL at 16:34

## 2017-01-01 RX ADMIN — RANOLAZINE 1000 MG: 500 TABLET, FILM COATED, EXTENDED RELEASE ORAL at 08:43

## 2017-01-01 RX ADMIN — ASPIRIN 81 MG: 81 TABLET ORAL at 09:43

## 2017-01-01 RX ADMIN — OXYCODONE HYDROCHLORIDE AND ACETAMINOPHEN 1 TABLET: 7.5; 325 TABLET ORAL at 04:40

## 2017-01-01 RX ADMIN — FEBUXOSTAT 40 MG: 80 TABLET ORAL at 08:38

## 2017-01-01 RX ADMIN — GABAPENTIN 300 MG: 300 CAPSULE ORAL at 20:28

## 2017-01-01 RX ADMIN — VITAMIN E CAP 400 UNIT 400 UNITS: 400 CAP at 09:42

## 2017-01-01 RX ADMIN — SODIUM CHLORIDE 100 ML/HR: 9 INJECTION, SOLUTION INTRAVENOUS at 13:15

## 2017-01-01 RX ADMIN — ENOXAPARIN SODIUM 40 MG: 40 INJECTION SUBCUTANEOUS at 17:48

## 2017-01-01 RX ADMIN — Medication 1000 MCG: at 08:43

## 2017-01-01 RX ADMIN — LEVOTHYROXINE SODIUM 100 MCG: 100 TABLET ORAL at 09:08

## 2017-01-01 RX ADMIN — DOCUSATE SODIUM -SENNOSIDES 2 TABLET: 50; 8.6 TABLET, COATED ORAL at 08:13

## 2017-01-01 RX ADMIN — FOLIC ACID 1 MG: 1 TABLET ORAL at 08:43

## 2017-01-01 RX ADMIN — POLYETHYLENE GLYCOL (3350) 17 G: 17 POWDER, FOR SOLUTION ORAL at 09:00

## 2017-01-01 RX ADMIN — IPRATROPIUM BROMIDE AND ALBUTEROL SULFATE 3 ML: 2.5; .5 SOLUTION RESPIRATORY (INHALATION) at 11:21

## 2017-01-01 RX ADMIN — ATORVASTATIN CALCIUM 10 MG: 10 TABLET, FILM COATED ORAL at 20:58

## 2017-01-01 ASSESSMENT — ENCOUNTER SYMPTOMS
SHORTNESS OF BREATH: 1
CHEST TIGHTNESS: 1
CONSTIPATION: 1
SHORTNESS OF BREATH: 1
BACK PAIN: 1
COUGH: 0
SHORTNESS OF BREATH: 1
ABDOMINAL DISTENTION: 0
DIARRHEA: 0

## 2017-01-13 PROBLEM — R07.9 CHEST PAIN ON EXERTION: Status: ACTIVE | Noted: 2017-01-01

## 2017-01-13 NOTE — PROGRESS NOTES
[facility]  HEMATOLOGY & ONCOLOGY        Subjective   Patient presents for follow-up.  Is due for his cycle 23.  He is tolerating the treatment extremely well.  He continues to have dyspnea on exertion and left chest pain on exertion       VISIT DIAGNOSIS:   No diagnosis found.    REASON FOR VISIT:   No chief complaint on file.       HEMATOLOGY / ONCOLOGY HISTORY:      Malignant neoplasm of pleura    9/17/2016 Initial Diagnosis    Malignant neoplasm of pleura      Mesothelioma of pleura    11/15/2013 Initial Diagnosis    Bronchoscopy, Left Thoracoscopy and Pleural biopsies. Biopsies positive for malignant mesothelioma.      12/27/2013 - 6/27/2014 Chemotherapy    Alimta 500mg/m2 d1, Carboplatin AUC5 d1 q 21 days       8/1/2014 - 3/13/2015 Chemotherapy    Alimta-maintenance-500mg/m2 q21d x 12 cycles       3/16/2015 Progression    Development of palpable Right Supraclavicular Lymphadenopathy. Biopsy confirmed positive for mesothelioma.       3/27/2015 - 8/23/2015 Chemotherapy    Carbo AUC 5 D1/Gemzar 1000 mg/m2 D1,8 q21d       8/24/2015 Progression    Increasing right paratracheal nodes and right supraclavicular adenopathy.      9/11/2015 -  Chemotherapy    OP LUNG Pembrolizumab  Keytruda (Pembrolizumab) 2mg/kg q 3 weeks         Cancer Staging Information:  Mesothelioma of pleura    Staging form: Pleural Mesothelioma, AJCC V7      Clinical stage from 9/25/2016: Stage IV (T4, N2, M0) - Signed by BRAYDON Maradiaga on 9/25/2016      INTERVAL HISTORY  Patient ID: Kvng Nolan is a 76 y.o. 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 He is presently receiving systemic treatment with Keytruda  and is here today for cycle 21 today.       Mr. Nolan has been tolerating this treatment regimen well. His most recent scans were on 8/31/16 and showed the previous supravlavicular adenopathy was no longer  identified, no change in the large loculated left pleural effusion, stable irregular pleural thickening and no change in the mediastinal/paratracheal nodes. The abdomen and pelvis continue to show no evidence of disease.      He had repeat thyroid panel at last visit showing near normal values. I have advised him to continue the same dosing of Synthroid for now. We will continue to monitor. We will continue Keytruda for today's visit. His labs are stable and listed below.       His counts are stable today for therapy today and he will proceed with therapy.       Past Medical History:   Past Medical History   Diagnosis Date   • Arthritis    • Cataract    • Cervical vertebral fracture    • COPD (chronic obstructive pulmonary disease)    • Coronary artery disease    • Encounter for antineoplastic chemotherapy 9/17/2016   • Gout    • Hyperlipidemia    • Hypertension    • Malignant neoplasm of pleura 9/17/2016   • Mesothelioma    • Mesothelioma of pleura 9/17/2016   • Ocular histoplasmosis    • Thrombopenia 9/17/2016     Past Surgical History:   Past Surgical History   Procedure Laterality Date   • Appendectomy     • Thoracentesis     • Bronchoscopy     • Hand surgery Right    • Elbow procedure Left    • Cardiac catheterization     • Coronary stent placement       Social History:   Social History     Social History   • Marital status:      Spouse name: N/A   • Number of children: N/A   • Years of education: N/A     Occupational History   • Not on file.     Social History Main Topics   • Smoking status: Former Smoker     Packs/day: 3.00     Years: 38.00     Types: Cigarettes     Quit date: 9/23/1986   • Smokeless tobacco: Not on file   • Alcohol use Yes   • Drug use: No   • Sexual activity: Defer     Other Topics Concern   • Not on file     Social History Narrative     Family History:   Family History   Problem Relation Age of Onset   • No Known Problems Daughter    • Heart disease Mother      Bypass   •  Alzheimer's disease Mother    • Heart disease Father    • Diabetes Father    • Cancer Brother      Leukemia   • No Known Problems Maternal Grandmother    • No Known Problems Maternal Grandfather    • No Known Problems Paternal Grandmother    • No Known Problems Paternal Grandfather        Review of Systems   Constitutional: Negative for activity change, appetite change, chills, diaphoresis, fever and unexpected weight change.   HENT: Negative for congestion, facial swelling, mouth sores, nosebleeds, sore throat, trouble swallowing and voice change.    Eyes: Negative for discharge and redness.   Respiratory: Positive for shortness of breath. Negative for cough, chest tightness and wheezing.    Cardiovascular: Negative for chest pain, palpitations and leg swelling.   Gastrointestinal: Negative for abdominal distention, abdominal pain, blood in stool, constipation, diarrhea, nausea and vomiting.   Endocrine: Negative.    Genitourinary: Negative for difficulty urinating, dysuria, frequency, hematuria and urgency.   Musculoskeletal: Negative for arthralgias, gait problem and myalgias.   Skin: Negative for color change and rash.   Neurological: Negative for dizziness, weakness, light-headedness, numbness and headaches.   Hematological: Negative for adenopathy. Does not bruise/bleed easily.   Psychiatric/Behavioral: Negative for confusion and sleep disturbance. The patient is not nervous/anxious.         Performance Status:  Symptomatic; fully ambulatory    Medications:    Current Outpatient Prescriptions   Medication Sig Dispense Refill   • aspirin 81 MG EC tablet Take 81 mg by mouth daily.     • atenolol (TENORMIN) 25 MG tablet Take 25 mg by mouth daily.     • atorvastatin (LIPITOR) 10 MG tablet Take 10 mg by mouth daily.     • diclofenac-misoprostol (ARTHROTEC 75) 75-0.2 MG EC tablet Take 1 tablet by mouth 2 (two) times a day.     • febuxostat (ULORIC) 40 MG tablet Take 40 mg by mouth daily.     • folic acid (FOLVITE) 1  "MG tablet Take 1 mg by mouth daily.     • gabapentin (NEURONTIN) 300 MG capsule Take 300 mg by mouth 3 (three) times a day.     • isosorbide mononitrate (IMDUR) 120 MG 24 hr tablet Take 1 tablet by mouth 2 (Two) Times a Day. 180 tablet 3   • levothyroxine (SYNTHROID) 125 MCG tablet Take 1 tablet by mouth Daily. (Patient taking differently: Take 100 mcg by mouth Daily.) 90 tablet 3   • losartan (COZAAR) 50 MG tablet Take 100 mg by mouth Daily.     • Multiple Vitamins-Minerals (MULTIVITAMIN ADULT PO) Take  by mouth.     • ranolazine (RANEXA) 500 MG 12 hr tablet Take 1,000 mg by mouth 2 (Two) Times a Day.     • vitamin B-12 (CYANOCOBALAMIN) 1000 MCG tablet Take 1,000 mcg by mouth daily.     • vitamin E 400 UNIT capsule Take 400 Units by mouth daily.       No current facility-administered medications for this visit.        ALLERGIES:  No Known Allergies    Objective      Vitals:    01/13/17 1024   BP: 110/60   Pulse: 60   Resp: 16   Temp: 98.4 °F (36.9 °C)   TempSrc: Tympanic   SpO2: 98%   Weight: 236 lb 14.4 oz (107 kg)   Height: 68\" (172.7 cm)     Visit Vitals   • /60   • Pulse 60   • Temp 98.4 °F (36.9 °C) (Tympanic)   • Resp 16   • Ht 68\" (172.7 cm)   • Wt 236 lb 14.4 oz (107 kg)   • SpO2 98%   • BMI 36.02 kg/m2       Current Status 1/13/2017   ECOG score 0         General Appearance:    Alert, cooperative, no distress, appears stated age   Head:    Normocephalic, without obvious abnormality, atraumatic   Eyes:    PERRL, conjunctiva pink, sclera clear, EOM's intact   Ears:    Not examined   Nose:   Nares normal, septum midline, mucosa normal, no drainage     or sinus tenderness   Throat:   Lips, mucosa, and tongue normal; teeth and gums normal,     mucous membranes moist   Neck:   Supple, Trachea midline   Back:     Symmetric, no curvature, ROM normal, no CVA tenderness   Lungs:    decreased air entry in the left lung crackles.     Chest Wall:    No tenderness or deformity    Heart:    Regular rate and rhythm, " S1 and S2 normal, no murmur, rub    or gallop   Abdomen:     Soft, non-tender, bowel sounds active all four quadrants,     no masses, no organomegaly   Extremities:   Extremities normal, atraumatic, no cyanosis or edema       Skin:   Skin color, texture, turgor normal, no rashes or lesions   Lymph nodes:   Cervical, supraclavicular, and axillary nodes normal   Neurologic:   Grossly nonfocal, gait coordinated and smooth, cognition is   preserved.       RECENT LABS:  Results for orders placed or performed in visit on 01/13/17   CBC Auto Differential   Result Value Ref Range    WBC 7.30 4.80 - 10.80 10*3/mm3    RBC 3.80 (L) 4.70 - 6.10 10*6/mm3    Hemoglobin 12.5 (L) 14.0 - 18.0 g/dL    Hematocrit 42.4 42.0 - 52.0 %    .5 (H) 80.0 - 94.0 fL    MCH 32.9 (H) 27.0 - 31.0 pg    MCHC 29.5 (L) 33.0 - 37.0 g/dL    RDW 12.2 11.5 - 14.5 %    MPV 9.4 6.0 - 12.0 fL    Platelets 201 130 - 400 10*3/mm3    Neutrophil % 56.9 37.0 - 92.0 %    Lymphocyte % 32.8 10.0 - 58.5 %    Auto Mixed Cells % 10.3 0.2 - 15.1 %    Neutrophils, Absolute 4.20 2.00 - 7.80 10*3/mm3    Lymphocytes, Absolute 2.40 0.80 - 7.00 10*3/mm3    Auto Mixed Cells # 0.80 0.10 - 1.50 10*3/uL     EXAMINATION: CT CHEST W CONTRAST- 1/10/2017 10:49 AM CST      HISTORY: Malignant neoplasm of parietal pleura      COMPARISON: 08/31/2016     FINDINGS:   A left subclavian Port-A-Cath is in place with tip at the cavoatrial  junction. The trachea and main bronchi are widely patent and in normal  anatomic position. The esophagus is grossly normal in appearance.      There is no significant supraclavicular or axillary lymphadenopathy.  Numerous lymph nodes are again seen within the mediastinum, none of  which appear pathologically enlarged. The previously measured left  paratracheal node measures approximately 7 mm in short axis on today's  exam.          IMPRESSION:  1. Interval development of fibrotic changes and areas of consolidation  involving the expanded portion of  the left lung, which may be related to  interval treatment or could reflect an infectious process.  2. Stable appearance of the large loculated left pleural effusion which  demonstrates some irregular thickening around the margins.  3. Tiny 3-4 mm noncalcified nodule within the superior aspect of the  right lower lobe for which attention on follow-up is recommended.  4. No appreciable pathologic lymphadenopathy.  5. Hepatic steatosis.  This report was finalized on 01/10/2017 13:50 by Dr. Eriberto Wang MD.        Assessment/Plan     Patient Active Problem List   Diagnosis   • Malignant neoplasm of pleura   • Mesothelioma of pleura   • Thrombopenia   • Encounter for antineoplastic chemotherapy   • COPD (chronic obstructive pulmonary disease)   • Essential hypertension   • Drug-induced hypothyroidism   • Low back pain without sciatica        Diagnoses and all orders for this visit:    Mesothelioma of pleurapt undergoing Keytruda and tolerating it well. He is now ready for cycle 23. His last scans in August continued to show improvement in his disease.     Restaging CT on 01/10/2016 does not show any evidence of progression.  There is some evidence of fibrotic changes involving the left lung and a chronic stable large left loculated  pleural effusion..  There are also some areas of consolidation but the patient clinically does not have any evidence of pneumonia.     - Cycle 23 Keytruda today   - Return in 3 weeks for next course        Left Chest Pain :- For the last 2 months he has noted worsening discomfort on the left side of his chest radiating to the inner arm.  Patient has seen cardiologist, and a stress test was also done about a month back.  Cardiologist saw him that at this point cardiac stenting would not help his chest pains and only CABG will be helpful.  He has metastatic disease and therefore a conservative approach may be considered eventually.  We can also consider  thoracic surgeon consultation drain  the large left loculated pleural effusion and see if that might control his chest pain symptoms.  He has been symptoms are only during exertion, I have recommended the patient to participate in cardiac rehabilitation program      Encounter for antineoplastic chemotherapy    Essential hypertension - stable and controlled   - Continue medication management   - COntinue to see PCP    Chronic obstructive pulmonary disease, unspecified COPD type - stable without exacerbation   - Continue to follow    Drug-induced hypothyroidism - TSH now near normal .    - Continue same dosing        three-week follow-up           Arley Gomez MD    1/13/2017    11:21 AM

## 2017-01-13 NOTE — MR AVS SNAPSHOT
Kvng Nolan   1/13/2017 10:30 AM   Office Visit    Dept Phone:  [phone]   Encounter #:  [id number]    Provider:  Arley Gomez MD   Department:  [facility] HEMATOLOGY & ONCOLOGY                Your Full Care Plan              Your Updated Medication List          This list is accurate as of: 1/13/17 11:35 AM.  Always use your most recent med list.                aspirin 81 MG EC tablet       atenolol 25 MG tablet   Commonly known as:  TENORMIN       atorvastatin 10 MG tablet   Commonly known as:  LIPITOR       diclofenac-misoprostol 75-0.2 MG EC tablet   Commonly known as:  ARTHROTEC 75       febuxostat 40 MG tablet   Commonly known as:  ULORIC       folic acid 1 MG tablet   Commonly known as:  FOLVITE       gabapentin 300 MG capsule   Commonly known as:  NEURONTIN       isosorbide mononitrate 120 MG 24 hr tablet   Commonly known as:  IMDUR   Take 1 tablet by mouth 2 (Two) Times a Day.       levothyroxine 125 MCG tablet   Commonly known as:  SYNTHROID   Take 1 tablet by mouth Daily.       losartan 50 MG tablet   Commonly known as:  COZAAR       MULTIVITAMIN ADULT PO       ranolazine 500 MG 12 hr tablet   Commonly known as:  RANEXA       vitamin B-12 1000 MCG tablet   Commonly known as:  CYANOCOBALAMIN       vitamin E 400 UNIT capsule               We Performed the Following     ONC Nursing Communication       You Were Diagnosed With        Codes Comments    Malignant neoplasm of pleura    -  Primary ICD-10-CM: C38.4  ICD-9-CM: 163.9     Mesothelioma of pleura     ICD-10-CM: C45.0  ICD-9-CM: 163.9     Thrombopenia     ICD-10-CM: D69.6  ICD-9-CM: 287.5     Encounter for antineoplastic chemotherapy     ICD-10-CM: Z51.11  ICD-9-CM: V58.11       Instructions     None    Patient Instructions History      Upcoming Appointments     Visit Type Date Time Department    FOLLOW UP 1 UNIT 1/13/2017 10:30 AM [facility]    LAB 1/13/2017 10:15 AM [facility]    FOLLOW UP  "1/24/2017  8:00 AM [facility]AD    FOLLOW UP 1 UNIT 2/3/2017 10:50 AM [facility]    [facility]      MyChart Signup     Our records indicate that you have an active [facility] account.    You can view your After Visit Summary by going to [employer] and logging in with your [employer] username and password.  If you don't have a [employer] username and password but a parent or guardian has access to your record, the parent or guardian should login with their own [employer] username and password and access your record to view the After Visit Summary.    If you have questions, you can email [email or web address] or call [phone] to talk to our [employer] staff.  Remember, [employer] is NOT to be used for urgent needs.  For medical emergencies, dial 911.               Other Info from Your Visit           Your Appointments     Jan 24, 2017  8:00 AM CST   Follow Up with Tejinder Britton MD   [facility] HEART GROUP (--)    [address] [place] [address] [address]  [facility] [id number] [phone]           Arrive 15 minutes prior to appointment.            Feb 03, 2017 10:30 AM CST   LAB with [facility] ONC PAD LAB   [facility] HEMATOLOGY & ONCOLOGY ([place])    [address] [facility] [place] [address]   [phone]            Feb 03, 2017 10:50 AM CST   FOLLOW UP with Blaze Regalado MD   [facility] HEMATOLOGY & ONCOLOGY ([place])    [address] [facility] [place] [address]   [phone]              Allergies     No Known Allergies      Vital Signs     Blood Pressure Pulse Temperature Respirations Height Weight    110/60 60 98.4 °F (36.9 °C) (Tympanic) 16 68\" (172.7 cm) 236 lb 14.4 oz (107 kg)    Oxygen Saturation Body Mass Index Smoking Status             98% 36.02 kg/m2 Former Smoker         Problems and Diagnoses Noted     Patient on antineoplastic chemotherapy regimen    Cancer of lung lining    Mesothelioma of pleura    Thrombopenia    "   Medications Administered     heparin flush (porcine) 100 UNIT/ML injection 500 Units                  sodium chloride 0.9 % flush 10 mL

## 2017-01-24 NOTE — MR AVS SNAPSHOT
Kvng Nolan   1/24/2017 8:00 AM   Office Visit    Dept Phone:  [phone]   Encounter #:  [id number]    Provider:  Tejinder Britton MD   Department:  [facility] HEART GROUP                Your Full Care Plan              Your Updated Medication List          This list is accurate as of: 1/24/17  8:51 AM.  Always use your most recent med list.                aspirin 81 MG EC tablet       atenolol 25 MG tablet   Commonly known as:  TENORMIN       atorvastatin 10 MG tablet   Commonly known as:  LIPITOR       diclofenac-misoprostol 75-0.2 MG EC tablet   Commonly known as:  ARTHROTEC 75       febuxostat 40 MG tablet   Commonly known as:  ULORIC       folic acid 1 MG tablet   Commonly known as:  FOLVITE       gabapentin 300 MG capsule   Commonly known as:  NEURONTIN       isosorbide mononitrate 120 MG 24 hr tablet   Commonly known as:  IMDUR   Take 1 tablet by mouth 2 (Two) Times a Day.       levothyroxine 125 MCG tablet   Commonly known as:  SYNTHROID   Take 1 tablet by mouth Daily.       losartan 50 MG tablet   Commonly known as:  COZAAR       MULTIVITAMIN ADULT PO       ranolazine 500 MG 12 hr tablet   Commonly known as:  RANEXA       vitamin B-12 1000 MCG tablet   Commonly known as:  CYANOCOBALAMIN       vitamin E 400 UNIT capsule               You Were Diagnosed With        Codes Comments    Chronic obstructive pulmonary disease, unspecified COPD type    -  Primary ICD-10-CM: J44.9  ICD-9-CM: 496     Essential hypertension     ICD-10-CM: I10  ICD-9-CM: 401.9     Malignant neoplasm of pleura     ICD-10-CM: C38.4  ICD-9-CM: 163.9     Mesothelioma of pleura     ICD-10-CM: C45.0  ICD-9-CM: 163.9     Thrombopenia     ICD-10-CM: D69.6  ICD-9-CM: 287.5     Encounter for antineoplastic chemotherapy     ICD-10-CM: Z51.11  ICD-9-CM: V58.11     Drug-induced hypothyroidism     ICD-10-CM: E03.2  ICD-9-CM: 244.3     Chronic low back pain without sciatica, unspecified back pain  "laterality     ICD-10-CM: M54.5, G89.29  ICD-9-CM: 724.2, 338.29       Instructions     None    Patient Instructions History      Upcoming Appointments     Visit Type Date Time Department    FOLLOW UP 1/24/2017  8:00 AM [facility] PAD    FOLLOW UP 1 UNIT 2/3/2017 10:50 AM [facility]    LAB 2/3/2017 10:30 AM [facility]    FOLLOW UP 5/23/2017  8:00 AM [facility] PAD      MyChart Signup     Our records indicate that you have an active [employer] account.    You can view your After Visit Summary by going to [employer] and logging in with your [facility] username and password.  If you don't have a [facility] username and password but a parent or guardian has access to your record, the parent or guardian should login with their own [facility] username and password and access your record to view the After Visit Summary.    If you have questions, you can email [email or web address] or call [phone] to talk to our [facility] staff.  Remember, [employer] is NOT to be used for urgent needs.  For medical emergencies, dial 911.               Other Info from Your Visit           Your Appointments     Feb 03, 2017 10:30 AM CST   LAB with [facility] ONC PAD LAB   [facility] HEMATOLOGY & ONCOLOGY ([place])    [address] [facility] [address]   [phone]            Feb 03, 2017 10:50 AM CST   FOLLOW UP with Blaze Regalado MD   [facility] HEMATOLOGY & ONCOLOGY ([place])    [address] [facility] [place] [address]   [phone]            May 23, 2017  8:00 AM CDT   Follow Up with Tejinder Britton MD   [facility] (--)    [address] [place] [address] [address] [place] [id number] [phone]           Arrive 15 minutes prior to appointment.              Allergies     No Known Allergies      Reason for Visit     Chest Pain 1 mo f/u      Vital Signs     Blood Pressure Pulse Height Weight Oxygen Saturation Body Mass Index    110/62 83 68\" (172.7 cm) " 233 lb (106 kg) 94% 35.43 kg/m2    Smoking Status                   Former Smoker           Problems and Diagnoses Noted     Chronic airway obstruction    Underactive thyroid due to drugs    Patient on antineoplastic chemotherapy regimen    High blood pressure    Low back pain    Cancer of lung lining    Mesothelioma of pleura    Thrombopenia

## 2017-01-24 NOTE — PROGRESS NOTES
Kvng Nolan  [id number]  1940  76 y.o.  male    Referring Provider: Bob Fulton MD    Reason for Follow-up Visit: Chest pain      Overall doing well  Intermittent chest pain   Moderate  shortness of breath  Compliant with medications    History of present illness:  Kvng Nolan is a 76 y.o. yo male with history of chest pain who presents today for   Chief Complaint   Patient presents with   • Chest Pain     1 mo f/u   .    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   • Ocular histoplasmosis    • Thrombopenia 9/17/2016   ,   Past Surgical History   Procedure Laterality Date   • Appendectomy     • Thoracentesis     • Bronchoscopy     • Hand surgery Right    • Elbow procedure Left    • Cardiac catheterization     • Coronary stent placement     ,   Family History   Problem Relation Age of Onset   • No Known Problems Daughter    • Heart disease Mother      Bypass   • Alzheimer's disease Mother    • Heart disease Father    • Diabetes Father    • Cancer Brother      Leukemia   • No Known Problems Maternal Grandmother    • No Known Problems Maternal Grandfather    • No Known Problems Paternal Grandmother    • No Known Problems Paternal Grandfather    ,   Social History   Substance Use Topics   • Smoking status: Former Smoker     Packs/day: 3.00     Years: 38.00     Types: Cigarettes     Quit date: 9/23/1986   • Smokeless tobacco: None   • Alcohol use Yes   ,     Medications  Current Outpatient Prescriptions   Medication Sig Dispense Refill   • aspirin 81 MG EC tablet Take 81 mg by mouth daily.     • atenolol (TENORMIN) 25 MG tablet Take 25 mg by mouth  "daily.     • atorvastatin (LIPITOR) 10 MG tablet Take 10 mg by mouth daily.     • diclofenac-misoprostol (ARTHROTEC 75) 75-0.2 MG EC tablet Take 1 tablet by mouth 2 (two) times a day.     • febuxostat (ULORIC) 40 MG tablet Take 40 mg by mouth daily.     • folic acid (FOLVITE) 1 MG tablet Take 1 mg by mouth daily.     • gabapentin (NEURONTIN) 300 MG capsule Take 300 mg by mouth 3 (three) times a day.     • isosorbide mononitrate (IMDUR) 120 MG 24 hr tablet Take 1 tablet by mouth 2 (Two) Times a Day. 180 tablet 3   • levothyroxine (SYNTHROID) 125 MCG tablet Take 1 tablet by mouth Daily. (Patient taking differently: Take 100 mcg by mouth Daily.) 90 tablet 3   • losartan (COZAAR) 50 MG tablet Take 100 mg by mouth Daily.     • Multiple Vitamins-Minerals (MULTIVITAMIN ADULT PO) Take  by mouth.     • ranolazine (RANEXA) 500 MG 12 hr tablet Take 1,000 mg by mouth 2 (Two) Times a Day.     • vitamin B-12 (CYANOCOBALAMIN) 1000 MCG tablet Take 1,000 mcg by mouth daily.     • vitamin E 400 UNIT capsule Take 400 Units by mouth daily.       No current facility-administered medications for this visit.        Allergies:  Review of patient's allergies indicates no known allergies.    Review of Systems  Review of Systems   Constitution: Negative.   HENT: Negative.    Eyes: Negative.    Cardiovascular: Positive for chest pain and dyspnea on exertion. Negative for claudication, cyanosis, irregular heartbeat, leg swelling, near-syncope, orthopnea, palpitations, paroxysmal nocturnal dyspnea and syncope.   Respiratory: Negative.    Endocrine: Negative.    Hematologic/Lymphatic: Negative.    Skin: Negative.    Gastrointestinal: Negative for anorexia.   Genitourinary: Negative.    Neurological: Negative.    Psychiatric/Behavioral: Negative.        Objective     Physical Exam:  Visit Vitals   • /62   • Pulse 83   • Ht 68\" (172.7 cm)   • Wt 233 lb (106 kg)   • SpO2 94%   • BMI 35.43 kg/m2     Physical Exam   Constitutional: He appears " well-developed.   HENT:   Head: Normocephalic.   Neck: Normal carotid pulses and no JVD present. No tracheal tenderness present. Carotid bruit is not present. No tracheal deviation and no edema present.   Cardiovascular: Regular rhythm, normal heart sounds and normal pulses.    Pulmonary/Chest: Effort normal. No stridor.   Abdominal: Soft.   Neurological: He is alert. He has normal strength. No cranial nerve deficit or sensory deficit.   Skin: Skin is warm.   Psychiatric: He has a normal mood and affect. His speech is normal and behavior is normal.       Results Review:     Procedures    Assessment/Plan   Patient Active Problem List   Diagnosis   • Malignant neoplasm of pleura   • Mesothelioma of pleura   • Thrombopenia   • Encounter for antineoplastic chemotherapy   • COPD (chronic obstructive pulmonary disease)   • Essential hypertension   • Drug-induced hypothyroidism   • Low back pain without sciatica   • Chest pain on exertion       No palpitations. No significant pedal edema. Compliant with medications and diet. Latest labs and medications reviewed.    Plan:  Recommend cardiac cath  He declined cardiac cath  Consider intensive medical therapy  Close follow up with you as scheduled.  Intensive factor modifications.  See order list.    Counseled regarding disease appropriate diet, fluid, caffeine, stimulants and sodium intake as well as importance of compliance to diet, exercise and regular follow up.    Return in about 4 months (around 5/24/2017).

## 2017-01-24 NOTE — LETTER
January 24, 2017     Bob Fulton MD  [address] [place] [id number]    Patient: Kvng Nolan   YOB: 1940   Date of Visit: 1/24/2017       Dear Dr. Donaldo MD:    Thank you for referring Kvng Nolan to me for evaluation. Below are the relevant portions of my assessment and plan of care.    If you have questions, please do not hesitate to call me. I look forward to following Kvng along with you.         Sincerely,        Tejinder Britton MD        CC: No Recipients  Tejinder Britton MD  1/24/2017  8:47 AM  Signed  Kvng Nolan  [id number]  1940  76 y.o.  male    Referring Provider: Bob Fulton MD    Reason for Follow-up Visit: Chest pain      Overall doing well  Intermittent chest pain   Moderate  shortness of breath  Compliant with medications    History of present illness:  Kvng Nolan is a 76 y.o. yo male with history of chest pain who presents today for   Chief Complaint   Patient presents with   • Chest Pain     1 mo f/u   .    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   • Ocular histoplasmosis    • Thrombopenia 9/17/2016   ,   Past Surgical History   Procedure Laterality Date   • Appendectomy     • Thoracentesis     • Bronchoscopy     • Hand surgery Right    • Elbow procedure Left    • Cardiac catheterization     • Coronary stent placement     ,   Family History   Problem Relation Age of Onset   • No Known Problems Daughter    • Heart disease Mother      Bypass   • Alzheimer's disease Mother    • Heart disease Father    • Diabetes Father    • Cancer Brother      Leukemia   • No Known Problems Maternal  Grandmother    • No Known Problems Maternal Grandfather    • No Known Problems Paternal Grandmother    • No Known Problems Paternal Grandfather    ,   Social History   Substance Use Topics   • Smoking status: Former Smoker     Packs/day: 3.00     Years: 38.00     Types: Cigarettes     Quit date: 9/23/1986   • Smokeless tobacco: None   • Alcohol use Yes   ,     Medications  Current Outpatient Prescriptions   Medication Sig Dispense Refill   • aspirin 81 MG EC tablet Take 81 mg by mouth daily.     • atenolol (TENORMIN) 25 MG tablet Take 25 mg by mouth daily.     • atorvastatin (LIPITOR) 10 MG tablet Take 10 mg by mouth daily.     • diclofenac-misoprostol (ARTHROTEC 75) 75-0.2 MG EC tablet Take 1 tablet by mouth 2 (two) times a day.     • febuxostat (ULORIC) 40 MG tablet Take 40 mg by mouth daily.     • folic acid (FOLVITE) 1 MG tablet Take 1 mg by mouth daily.     • gabapentin (NEURONTIN) 300 MG capsule Take 300 mg by mouth 3 (three) times a day.     • isosorbide mononitrate (IMDUR) 120 MG 24 hr tablet Take 1 tablet by mouth 2 (Two) Times a Day. 180 tablet 3   • levothyroxine (SYNTHROID) 125 MCG tablet Take 1 tablet by mouth Daily. (Patient taking differently: Take 100 mcg by mouth Daily.) 90 tablet 3   • losartan (COZAAR) 50 MG tablet Take 100 mg by mouth Daily.     • Multiple Vitamins-Minerals (MULTIVITAMIN ADULT PO) Take  by mouth.     • ranolazine (RANEXA) 500 MG 12 hr tablet Take 1,000 mg by mouth 2 (Two) Times a Day.     • vitamin B-12 (CYANOCOBALAMIN) 1000 MCG tablet Take 1,000 mcg by mouth daily.     • vitamin E 400 UNIT capsule Take 400 Units by mouth daily.       No current facility-administered medications for this visit.        Allergies:  Review of patient's allergies indicates no known allergies.    Review of Systems  Review of Systems   Constitution: Negative.   HENT: Negative.    Eyes: Negative.    Cardiovascular: Positive for chest pain and dyspnea on exertion. Negative for claudication, cyanosis,  "irregular heartbeat, leg swelling, near-syncope, orthopnea, palpitations, paroxysmal nocturnal dyspnea and syncope.   Respiratory: Negative.    Endocrine: Negative.    Hematologic/Lymphatic: Negative.    Skin: Negative.    Gastrointestinal: Negative for anorexia.   Genitourinary: Negative.    Neurological: Negative.    Psychiatric/Behavioral: Negative.        Objective     Physical Exam:  Visit Vitals   • /62   • Pulse 83   • Ht 68\" (172.7 cm)   • Wt 233 lb (106 kg)   • SpO2 94%   • BMI 35.43 kg/m2     Physical Exam   Constitutional: He appears well-developed.   HENT:   Head: Normocephalic.   Neck: Normal carotid pulses and no JVD present. No tracheal tenderness present. Carotid bruit is not present. No tracheal deviation and no edema present.   Cardiovascular: Regular rhythm, normal heart sounds and normal pulses.    Pulmonary/Chest: Effort normal. No stridor.   Abdominal: Soft.   Neurological: He is alert. He has normal strength. No cranial nerve deficit or sensory deficit.   Skin: Skin is warm.   Psychiatric: He has a normal mood and affect. His speech is normal and behavior is normal.       Results Review:     Procedures    Assessment/Plan   Patient Active Problem List   Diagnosis   • Malignant neoplasm of pleura   • Mesothelioma of pleura   • Thrombopenia   • Encounter for antineoplastic chemotherapy   • COPD (chronic obstructive pulmonary disease)   • Essential hypertension   • Drug-induced hypothyroidism   • Low back pain without sciatica   • Chest pain on exertion       No palpitations. No significant pedal edema. Compliant with medications and diet. Latest labs and medications reviewed.    Plan:  Recommend cardiac cath  He declined cardiac cath  Consider intensive medical therapy  Close follow up with you as scheduled.  Intensive factor modifications.  See order list.    Counseled regarding disease appropriate diet, fluid, caffeine, stimulants and sodium intake as well as importance of compliance to " diet, exercise and regular follow up.    Return in about 4 months (around 5/24/2017).

## 2017-02-07 NOTE — TELEPHONE ENCOUNTER
PT WAS RECENTLY SEEN BY YOU & DECLINED A RECOMMENDED HEART CATH.    HE HAS SINCE DECIDED TO PROCEED.    PLEASE PLACE CATH ORDER.

## 2017-02-23 PROBLEM — I20.9 ISCHEMIC CHEST PAIN (HCC): Status: ACTIVE | Noted: 2017-01-01

## 2017-02-24 PROBLEM — I25.118 CORONARY ARTERY DISEASE OF NATIVE ARTERY OF NATIVE HEART WITH STABLE ANGINA PECTORIS (HCC): Status: ACTIVE | Noted: 2017-01-01

## 2017-02-28 NOTE — TELEPHONE ENCOUNTER
We are awaiting medical records from his oncologist Dr. Adri Gutierrez in [place], [place]. We will let him know something when we obtain them.   Thank you

## 2017-02-28 NOTE — TELEPHONE ENCOUNTER
Attempted to call patient. Left message to return call. If he calls, please tell him we are awaiting medical records from his oncologist.   Thank you.

## 2017-03-14 NOTE — TELEPHONE ENCOUNTER
PT CAME INTO OFFICE TODAY STATING THAT HE HAD 10 PILLS OF PLAVIX LEFT.    CALLED CT (HE SAW KAMERON TODAY) & WAS TOLD YOU WOULD NEED TO MAKE THE CALL ABOUT KEEPING HIM ON IT. HE FOLLOWS UP WITH YOU IN MAY.    RX IS ATTACHED FOR SIGNATURE IF YOU DECIDE TO DO SO.

## 2017-03-22 NOTE — PROGRESS NOTES
Kvng Nolan  [id number]  1940  76 y.o.  male    Referring Provider: Bob Fulton MD    Reason for Follow-up Visit: Chest pain      Overall doing well  Intermittent chest pain   Moderate  shortness of breath  Compliant with medications    History of present illness:  Kvng Nolan is a 76 y.o. yo male with history of chest pain who presents today for   Chief Complaint   Patient presents with   • Coronary Artery Disease     1 mon   • Hypertension   • Shortness of Breath     with excertion   .    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   • Ocular histoplasmosis    • Thrombopenia 9/17/2016   ,   Past Surgical History:   Procedure Laterality Date   • APPENDECTOMY     • BRONCHOSCOPY     • CARDIAC CATHETERIZATION     • CARDIAC CATHETERIZATION Left 2/23/2017    Procedure: Cardiac Catheterization/Vascular Study;  Surgeon: Tejinder Britton MD;  Location:  [facility];  Service:    • CARDIAC CATHETERIZATION Right 2/23/2017    Procedure: Angioplasty-coronary;  Surgeon: Tejinder Britton MD;  Location:  [facility];  Service:    • CORONARY STENT PLACEMENT     • ELBOW PROCEDURE Left    • EYE SURGERY     • HAND SURGERY Right    • THORACENTESIS     ,   Family History   Problem Relation Age of Onset   • No Known Problems Daughter    • Heart disease Mother      Bypass   • Alzheimer's disease Mother    • Heart disease Father    • Diabetes Father    • Cancer Brother      Leukemia   • No Known Problems Maternal Grandmother    • No Known Problems Maternal Grandfather    • No Known Problems Paternal Grandmother    • No Known Problems Paternal  Grandfather    ,   Social History   Substance Use Topics   • Smoking status: Former Smoker     Packs/day: 3.00     Years: 38.00     Types: Cigarettes     Quit date: 9/23/1986   • Smokeless tobacco: Former User   • Alcohol use Yes   ,     Medications  Current Outpatient Prescriptions   Medication Sig Dispense Refill   • aspirin 81 MG EC tablet Take 81 mg by mouth daily.     • atenolol (TENORMIN) 25 MG tablet TAKE 1 TABLET DAILY 90 tablet 3   • atorvastatin (LIPITOR) 10 MG tablet TAKE 1 TABLET AT BEDTIME 90 tablet 1   • clopidogrel (PLAVIX) 75 MG tablet Take 1 tablet by mouth Daily. 90 tablet 3   • diclofenac-misoprostol (ARTHROTEC 75) 75-0.2 MG EC tablet Take 1 tablet by mouth 2 (two) times a day.     • febuxostat (ULORIC) 40 MG tablet Take 40 mg by mouth daily.     • folic acid (FOLVITE) 1 MG tablet Take 1 mg by mouth daily.     • gabapentin (NEURONTIN) 300 MG capsule Take 300 mg by mouth 3 (three) times a day.     • isosorbide mononitrate (IMDUR) 120 MG 24 hr tablet Take 1 tablet by mouth 2 (Two) Times a Day. 180 tablet 3   • levothyroxine (SYNTHROID) 125 MCG tablet Take 1 tablet by mouth Daily. (Patient taking differently: Take 100 mcg by mouth Daily.) 90 tablet 3   • losartan (COZAAR) 50 MG tablet Take 100 mg by mouth Daily.     • Multiple Vitamins-Minerals (MULTIVITAMIN ADULT PO) Take  by mouth.     • ranolazine (RANEXA) 500 MG 12 hr tablet Take 1,000 mg by mouth 2 (Two) Times a Day.     • vitamin B-12 (CYANOCOBALAMIN) 1000 MCG tablet Take 1,000 mcg by mouth daily.     • vitamin E 400 UNIT capsule Take 400 Units by mouth daily.     • amLODIPine (NORVASC) 5 MG tablet Take 1 tablet by mouth Daily. 30 tablet 11     No current facility-administered medications for this visit.        Allergies:  Review of patient's allergies indicates no known allergies.    Review of Systems  Review of Systems   Constitution: Negative.   HENT: Negative.    Eyes: Negative.    Cardiovascular: Positive for chest pain and dyspnea on  "exertion. Negative for claudication, cyanosis, irregular heartbeat, leg swelling, near-syncope, orthopnea, palpitations, paroxysmal nocturnal dyspnea and syncope.   Respiratory: Negative.    Endocrine: Negative.    Hematologic/Lymphatic: Negative.    Skin: Negative.    Gastrointestinal: Negative for anorexia.   Genitourinary: Negative.    Neurological: Negative.    Psychiatric/Behavioral: Negative.        Objective     Physical Exam:  /60  Pulse 73  Ht 68\" (172.7 cm)  Wt 233 lb (106 kg)  SpO2 90%  BMI 35.43 kg/m2  Physical Exam   Constitutional: He appears well-developed.   HENT:   Head: Normocephalic.   Neck: Normal carotid pulses and no JVD present. No tracheal tenderness present. Carotid bruit is not present. No tracheal deviation and no edema present.   Cardiovascular: Regular rhythm, normal heart sounds and normal pulses.    Pulmonary/Chest: Effort normal. No stridor.   Abdominal: Soft.   Neurological: He is alert. He has normal strength. No cranial nerve deficit or sensory deficit.   Skin: Skin is warm.   Psychiatric: He has a normal mood and affect. His speech is normal and behavior is normal.       Results Review:     Procedures    Assessment/Plan   Patient Active Problem List   Diagnosis   • Malignant neoplasm of pleura   • Mesothelioma of pleura   • Thrombopenia   • Encounter for antineoplastic chemotherapy   • COPD (chronic obstructive pulmonary disease)   • Essential hypertension   • Drug-induced hypothyroidism   • Low back pain without sciatica   • Chest pain on exertion   • Ischemic chest pain   • Coronary artery disease of native artery of native heart with stable angina pectoris       No palpitations. No significant pedal edema. Compliant with medications and diet. Latest labs and medications reviewed.  CABG was declined    Plan:  Continue Ranexa 1000 mg BID  EECP  Norvasc 5 mg daily  Maximize medical therapy  Consider intensive medical therapy  Close follow up with you as " scheduled.  Intensive factor modifications.  See order list.    Counseled regarding disease appropriate diet, fluid, caffeine, stimulants and sodium intake as well as importance of compliance to diet, exercise and regular follow up.    Return in about 4 weeks (around 4/19/2017).

## 2017-03-29 NOTE — TELEPHONE ENCOUNTER
PT STATES THAT SINCE STARTING NORVASC HE HAS BEEN INCREASINGLY FATIGUED & JUST FEELS 'GIVE OUT'    WANTS TO KNOW IF THIS IS FROM MEDICATION & WHAT HE SHOULD DO

## 2017-04-04 NOTE — TELEPHONE ENCOUNTER
PT CALLED TO UPDATE ME SINCE STOPPING THE NORVASC. SAYS HE FEELS MUCH BETTER BUT STILL NOT AS GOOD AS HE DID 2 WEEKS AGO WHEN HE SAW YOU.    HE WOULD LIKE TO KNOW IF YOU THINK STARTING HIM ON OXYGEN AS NEEDED WOULD HELP. SAYS HE USED IT DURING CHEMOTHERAPY IN THE PAST & IT HELPED HIM.     PT ALSO WANTED ME TO LET YOU KNOW HE IS GOING TO [place] TO SEE HIS ONCOLOGIST ON THE 11TH.    PLEASE ADVISE REGARDING NORVASC & OXYGEN  HIS NEXT F/U WITH YOU IS 4/26/17

## 2017-04-11 NOTE — PROGRESS NOTES
"CC: Here to talk about surgery      Mr Nolan is a 77 yo male well known to me as he had a previous thoracoscopy with multiple pleural biopsies in November of 2013 with the diagnosis of epithelial type mesothelioma.  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  Currently he undergoes immunotherapy with very good response.  Unfortunately he presents with stable angina and shortness of breath with further cardiac workup identifying complex 2 vessel coronary artery disease not  amenable to percutaneous coronary intervention attempted by his cardiologist Dr. Britton.  As such a balloon angioplasty was performed and he does feel better today.  He currently is on plavix.  His chest pain does return in a stable fashion with exertion.  No rest pain.    Mr. Nolan's oncologist is Dr. Adri Ivy in [place], [place] and manages his administration of Keytruda every 3 weeks.  I did have the opportunity to speak with her prior to his direct face to face encounter.       His past medical history, past surgical history, social history, and family history above are confirmed with the patient.  A 14 point review of systems is performed noting shortness of breath, chest pain, fatigue, increasing weight, and osteoarthritis as salient positives.    Visit Vitals   • /70 (BP Location: Left arm, Patient Position: Sitting, Cuff Size: Adult)   • Pulse 67   • Ht 68\" (172.7 cm)   • Wt 232 lb (105 kg)   • SpO2 97%   • BMI 35.28 kg/m2       Physical Exam:    General: NAD, In good spirits  Cardiovascular: RRR, No murmur, rubs, or gallops.    Pulmonary: CTAB, No wheezing, rubs, or rales.  Abdomen: soft, Non-distended, and non-tender  Extremities: warm, ALANIZ  Neurologic:  No focal deficits, CN II-XII intact grossly.        Impression:  Stage IV mesothelioma  Stable angina  Coronary artery disease    Medical decision-making/recommendations/plan:    This is a difficult case.  From a isolated cardiac point of view he has two vessel disease " with stable angina refractory to medical maximal management.  Coronary artery bypass grafting is the best treatment with he having suitable targets.  But his case is greatly complicated by the fact that he has mesothelioma.  We discussed while it is very good that he had a good response with Keytruda, duration of favorable response is unknown.  Additionally it is discussed that he will need to stop therapy for potentially 2-3 courses and it is unknown as to what that will do to his oncologic treatment course-potentially a negative result.  We discussed that with advanced stage mesothelioma, customarily he would be recommended to continue with medical therapy and make adaptations to his activity levels since he can perform all ADL's at this time.  He does want to maintain his very active lifestyle.  Separately there is no good data to suggest his overall prognosis with Keytruda being a relatively new agent and for patient's specifically with mesothelioma which also greatly limits making a best recommendation for him.    With that being said after speaking with his oncologist and Hayley Ovidio, I believe further efforts to maximize medical therapy is the best course of care.  I spent 27 minutes directly counseling the patient and an additional 10 minutes talking to Dr. Ivy.  If the aforementioned efforts did not result in a suitable quality of life, I would be happy to reconsider surgical revascularization based on that current staging data available with the understanding that we may negatively impact his oncologic treatment and that his overall prognosis is difficult to determine such that he may not enjoy the benefits of CABG due to a short survival curve.  He is agreement to trying medical management.  I discussed with Kvng Nolan and  His wife the plan of care.  All questions were answered to the best of my ability.  He wife feels that would definitely be the best course of care.    Due to his advanced  stage cancer,he is not eligible for a BMI assessment but it is go to note he is a nonsmoker.    Although I have not given him a specific return to clinic appointment, I'd be glad to re-evaluate him as needed.

## 2017-04-13 PROBLEM — I95.9 HYPOTENSION: Status: ACTIVE | Noted: 2017-01-01

## 2017-04-13 NOTE — ED PROVIDER NOTES
Subjective   HPI Comments: The patient complains of profuse severe weakness over the past several weeks.  He does have a history of mesothelioma has been treated for that but says he is in remission according to his oncologist.  He also had a heart cath a few weeks ago and was told there were some blockages but they could not be stented and so was referred for possible bypass surgery by Dr. Hogue would not perform the surgery as yet because of his mesothelioma until cleared by his oncologist.  The patient and his family said that in the last few weeks he has been to the oncologist at [facility] be okay to offer to be off of his medication for his mesothelioma is been off of that for 3 weeks.  He also saw his family doctor who found him to have a low pulse ox and office and started him on oxygen.  It has made it feel better but he still very weak whenever he tries to get up and walk around.  He says that he spoke with Dr. Hogue and then with Dr. York's office over the last 2 days and was told to come to the ER to get his preop testing done so he can  have his bypass surgery faster.  He feels like the bypass surgery needs to be done in order correct his symptoms.  He does not mention any chest pain to me but his wife says he would have pain if he get up and walk around but he is so weak cannot walk around.    Patient is a 76 y.o. male presenting with weakness.   History provided by:  Patient   used: No    Weakness - Generalized   Severity:  Severe  Onset quality:  Gradual  Duration:  4 weeks  Timing:  Constant  Progression:  Worsening  Chronicity:  New  Context: not alcohol use, not allergies, not change in medication, not decreased sleep, not dehydration, not drug use, not increased activity, not pinched nerve, not recent infection, not stress and not urinary tract infection    Relieved by:  Nothing  Worsened by:  Nothing  Ineffective treatments:  None tried  Associated symptoms: lethargy  and shortness of breath    Associated symptoms: no abdominal pain, no anorexia, no aphasia, no arthralgias, no ataxia, no chest pain, no cough, no diarrhea, no difficulty walking, no dizziness, no drooling, no dysphagia, no dysuria, no numbness in extremities, no falls, no fever, no foul-smelling urine, no frequency, no headaches, no hematochezia, no loss of consciousness, no melena, no myalgias, no nausea, no near-syncope, no seizures, no sensory-motor deficit, no stroke symptoms, no syncope, no urgency, no vision change and no vomiting    Risk factors: coronary artery disease and diabetes    Risk factors: no anemia, no congestive heart failure, no excessive menstruation, no family hx of stroke, no heart disease, no neurologic disease, no new medications and no recent stressors        Review of Systems   Constitutional: Negative.  Negative for fever.   HENT: Negative for drooling.    Respiratory: Positive for shortness of breath. Negative for cough.    Cardiovascular: Negative.  Negative for chest pain, syncope and near-syncope.   Gastrointestinal: Negative.  Negative for abdominal pain, anorexia, diarrhea, dysphagia, hematochezia, melena, nausea and vomiting.   Genitourinary: Negative.  Negative for dysuria, frequency and urgency.   Musculoskeletal: Negative.  Negative for arthralgias, falls and myalgias.   Skin: Negative.    Neurological: Negative for dizziness, seizures, loss of consciousness and headaches.   Hematological: Negative.    Psychiatric/Behavioral: Negative.    All other systems reviewed and are negative.      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   • Ocular  histoplasmosis    • Thrombopenia 9/17/2016       No Known Allergies    Past Surgical History:   Procedure Laterality Date   • APPENDECTOMY     • BRONCHOSCOPY     • CARDIAC CATHETERIZATION     • CARDIAC CATHETERIZATION Left 2/23/2017    Procedure: Cardiac Catheterization/Vascular Study;  Surgeon: Tejinder Britton MD;  Location:  [facility];  Service:    • CARDIAC CATHETERIZATION Right 2/23/2017    Procedure: Angioplasty-coronary;  Surgeon: Tejinder Britton MD;  Location:  [facility];  Service:    • CORONARY STENT PLACEMENT     • ELBOW PROCEDURE Left    • EYE SURGERY     • HAND SURGERY Right    • THORACENTESIS         Family History   Problem Relation Age of Onset   • No Known Problems Daughter    • Heart disease Mother      Bypass   • Alzheimer's disease Mother    • Heart disease Father    • Diabetes Father    • Cancer Brother      Leukemia   • No Known Problems Maternal Grandmother    • No Known Problems Maternal Grandfather    • No Known Problems Paternal Grandmother    • No Known Problems Paternal Grandfather        Social History     Social History   • Marital status:      Spouse name: N/A   • Number of children: N/A   • Years of education: N/A     Social History Main Topics   • Smoking status: Former Smoker     Packs/day: 3.00     Years: 38.00     Types: Cigarettes     Quit date: 9/23/1986   • Smokeless tobacco: Former User   • Alcohol use Yes   • Drug use: No   • Sexual activity: Defer     Other Topics Concern   • None     Social History Narrative       Prior to Admission medications    Medication Sig Start Date End Date Taking? Authorizing Provider   aspirin 81 MG EC tablet Take 81 mg by mouth daily.   Yes Historical Provider, MD   atenolol (TENORMIN) 25 MG tablet TAKE 1 TABLET DAILY 3/6/17  Yes Tejinder Britton MD   atorvastatin (LIPITOR) 10 MG tablet TAKE 1 TABLET AT BEDTIME 3/10/17  Yes Tejinder Britton MD   clopidogrel (PLAVIX) 75 MG tablet Take 1 tablet by mouth Daily. 3/15/17   Yes Tejinder Britton MD   diclofenac-misoprostol (ARTHROTEC 75) 75-0.2 MG EC tablet Take 1 tablet by mouth 2 (two) times a day.   Yes Historical Provider, MD   febuxostat (ULORIC) 40 MG tablet Take 40 mg by mouth daily.   Yes Historical Provider, MD   folic acid (FOLVITE) 1 MG tablet Take 1 mg by mouth daily.   Yes Historical Provider, MD   gabapentin (NEURONTIN) 300 MG capsule Take 300 mg by mouth 3 (three) times a day.   Yes Historical Provider, MD   isosorbide mononitrate (IMDUR) 120 MG 24 hr tablet Take 1 tablet by mouth 2 (Two) Times a Day. 1/27/17  Yes Tejinder Britton MD   levothyroxine (SYNTHROID) 125 MCG tablet Take 1 tablet by mouth Daily.  Patient taking differently: Take 100 mcg by mouth Daily. 10/20/16  Yes BRAYDON Maradiaga   losartan (COZAAR) 50 MG tablet Take 100 mg by mouth Daily.   Yes Historical Provider, MD   Multiple Vitamins-Minerals (MULTIVITAMIN ADULT PO) Take  by mouth.   Yes Historical Provider, MD   ranolazine (RANEXA) 500 MG 12 hr tablet Take 1,000 mg by mouth 2 (Two) Times a Day.   Yes Historical Provider, MD   vitamin B-12 (CYANOCOBALAMIN) 1000 MCG tablet Take 1,000 mcg by mouth daily.   Yes Historical Provider, MD   vitamin E 400 UNIT capsule Take 400 Units by mouth daily.   Yes Historical Provider, MD   amLODIPine (NORVASC) 5 MG tablet Take 1 tablet by mouth Daily. 3/22/17   Tejinder Britton MD       Medications   sodium chloride 0.9 % flush 10 mL (not administered)       Vitals:    04/13/17 1501   BP: (!) 77/41   Pulse: 66   Resp:    SpO2: 96%         Objective   Physical Exam   Constitutional: He is oriented to person, place, and time. He appears well-developed.   HENT:   Head: Atraumatic.   Mouth/Throat: Oropharynx is clear and moist.   Eyes: EOM are normal. Pupils are equal, round, and reactive to light.   Neck: Normal range of motion. Neck supple.   Cardiovascular: Normal rate and regular rhythm.    Pulmonary/Chest: Effort normal and breath sounds normal.   Abdominal: Soft. Bowel  sounds are normal.   Musculoskeletal: Normal range of motion.   Neurological: He is alert and oriented to person, place, and time.   Skin: Skin is warm and dry.   Psychiatric: He has a normal mood and affect. His behavior is normal.   Nursing note and vitals reviewed.      Procedures         Lab Results (last 24 hours)     Procedure Component Value Units Date/Time    CBC & Differential [[id number]] Collected:  04/13/17 1414    Specimen:  Blood Updated:  04/13/17 1425    Narrative:       The following orders were created for panel order CBC & Differential.  Procedure                               Abnormality         Status                     ---------                               -----------         ------                     CBC Auto Differential[[id number]]         Abnormal            Final result                 Please view results for these tests on the individual orders.    Comprehensive Metabolic Panel [[id number]]  (Abnormal) Collected:  04/13/17 1414    Specimen:  Blood Updated:  04/13/17 1435     Glucose 202 (H) mg/dL      BUN 43 (H) mg/dL      Creatinine 2.83 (H) mg/dL      Sodium 137 mmol/L      Potassium 5.3 mmol/L      Chloride 95 (L) mmol/L      CO2 29.0 mmol/L      Calcium 10.0 mg/dL      Total Protein 7.6 g/dL      Albumin 3.80 g/dL      ALT (SGPT) 18 U/L      AST (SGOT) 33 U/L      Alkaline Phosphatase 111 U/L      Total Bilirubin 0.8 mg/dL      eGFR Non African Amer 22 (L) mL/min/1.73      Globulin 3.8 gm/dL      A/G Ratio 1.0 (L) g/dL      BUN/Creatinine Ratio 15.2     Anion Gap 13.0 mmol/L     Narrative:       The MDRD GFR formula is only valid for adults with stable renal function between ages 18 and 70.    D-dimer, Quantitative [[id number]]  (Abnormal) Collected:  04/13/17 1414    Specimen:  Blood Updated:  04/13/17 1442     D-Dimer, Quantitative 1.58 (H) mg/L (FEU)     Narrative:       Reference Range is 0-0.50 mg/L FEU. However, results <0.50 mg/L FEU tends to rule out DVT or PE. Results >0.50 mg/L  FEU are not useful in predicting absence or presence of DVT or PE.    BNP [[id number]]  (Normal) Collected:  04/13/17 1414    Specimen:  Blood Updated:  04/13/17 1443     proBNP 764.0 pg/mL     CBC Auto Differential [[id number]]  (Abnormal) Collected:  04/13/17 1414    Specimen:  Blood Updated:  04/13/17 1425     WBC 7.75 10*3/mm3      RBC 3.17 (L) 10*6/mm3      Hemoglobin 10.7 (L) g/dL      Hematocrit 31.5 (L) %      MCV 99.4 (H) fL      MCH 33.8 (H) pg      MCHC 34.0 g/dL      RDW 12.5 %      RDW-SD 46.2 fl      MPV 10.9 fL      Platelets 289 10*3/mm3      Neutrophil % 67.0 %      Lymphocyte % 18.6 %      Monocyte % 11.1 %      Eosinophil % 1.5 %      Basophil % 0.5 %      Immature Grans % 1.3 %      Neutrophils, Absolute 5.19 10*3/mm3      Lymphocytes, Absolute 1.44 10*3/mm3      Monocytes, Absolute 0.86 10*3/mm3      Eosinophils, Absolute 0.12 10*3/mm3      Basophils, Absolute 0.04 10*3/mm3      Immature Grans, Absolute 0.10 (H) 10*3/mm3     POC Troponin, Rapid [[id number]]  (Normal) Collected:  04/13/17 1423    Specimen:  Blood Updated:  04/13/17 1437     Troponin I 0.00 ng/mL       Serial Number: [id number]    : [id number]             XR Chest 1 View   Final Result          ED Course  ED Course   Comment By Time   I told the patient is blood pressure is very low and that is what I think is making him feel very weak right now.  Also his creatinine has more than doubled in the past month and that may be part of his problem..  I have tried to convince him and his wife that bypass surgery will not fix these problems.  I have explained to them that I understand he needs bypass surgery on not sure able to dress the immediate problem is having right now.  He needs to be in the hospital for IV fluids and medication adjustments. Akil Agarwal Jr., MD 04/13 1539          MDM  Number of Diagnoses or Management Options  Acute renal failure, unspecified acute renal failure type: new and requires workup  Hypotension,  unspecified hypotension type: new and requires workup     Amount and/or Complexity of Data Reviewed  Clinical lab tests: ordered and reviewed  Tests in the radiology section of CPT®: ordered and reviewed  Tests in the medicine section of CPT®: reviewed and ordered  Decide to obtain previous medical records or to obtain history from someone other than the patient: yes    Risk of Complications, Morbidity, and/or Mortality  Presenting problems: moderate  Diagnostic procedures: moderate  Management options: moderate    Patient Progress  Patient progress: stable      Final diagnoses:   Hypotension, unspecified hypotension type   Acute renal failure, unspecified acute renal failure type        Akil Agarwal Jr., MD  04/13/17 1536

## 2017-04-13 NOTE — CONSULTS
Patient Care Team:  Bob Fulton MD as PCP - General  Bob Fulton MD as PCP - Family Medicine  Viktoria Jolley MD as PCP - Claims Attributed - PLEASE DO NOT REMOVE  Tejinder Britton MD as Cardiologist (Cardiology)  Krystle Whittaker MD  REASON FOR REFERRAL: weakness, dyspnea, cad   Chief complaint: weakness     Subjective     Patient is a 76 y.o. male presents with progressively worsening weakness. He also reports some dyspnea, wheezing, and an episode of nausea and vomiting today. He denies any chest pain- he states he has not had any for the past couple of weeks. He also denies edema, weight gain, or palpitations. His amlodipine was stopped approximately 1 week ago due to weakness and hypotension. Workup in the ER today reveals LEANNA with creat of 2.83, hypotension, and elevated d-dimer. He has been given 1 liter of fluid and remains hypotensive. He is warm, dry and alert. Troponin and BNP are negative. EKG shows NSR, no acute STT changes. He states he did have upper respiratory congestion, fever, chills a couple of weeks ago but none presently.     He follows with Dr. Britton for his CAD history. Cath 2/2017 revealed 2 vessel CAD in the lcx and rca. PTCA to the RCA was unsuccessful. He was referred to Dr. Hogue for CABG. Due to multiple co morbidities, specifically his mesothelioma and the treatment it requires, Dr. Hogue and the patient opted for medical management as his last office visit. His cardiac medial therapy currently includes aspirin, plavix, statin, beta blocker, imdur, ranexa and ARB. The patient tells me today he recently saw his oncologist, who has given him the okay to interrupt treatment for the mesothelioma, if needed.      Review of Systems   Review of Systems   Constitutional: Positive for fatigue. Negative for diaphoresis, fever and unexpected weight change.   HENT: Negative for nosebleeds.    Respiratory: Positive for shortness of breath and wheezing. Negative for apnea,  cough and chest tightness.    Cardiovascular: Negative for chest pain, palpitations and leg swelling.   Gastrointestinal: Positive for nausea and vomiting. Negative for abdominal distention.   Genitourinary: Negative for hematuria.   Musculoskeletal: Negative for gait problem.   Skin: Negative for color change.   Neurological: Positive for weakness. Negative for dizziness, syncope and light-headedness.       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   • Ocular histoplasmosis    • Thrombopenia 9/17/2016     Past Surgical History:   Procedure Laterality Date   • APPENDECTOMY     • BRONCHOSCOPY     • CARDIAC CATHETERIZATION     • CARDIAC CATHETERIZATION Left 2/23/2017    Procedure: Cardiac Catheterization/Vascular Study;  Surgeon: Tejinder Britton MD;  Location:  [facility];  Service:    • CARDIAC CATHETERIZATION Right 2/23/2017    Procedure: Angioplasty-coronary;  Surgeon: Tejinder Britton MD;  Location:  [facility];  Service:    • CORONARY STENT PLACEMENT     • ELBOW PROCEDURE Left    • EYE SURGERY     • HAND SURGERY Right    • THORACENTESIS       Family History   Problem Relation Age of Onset   • No Known Problems Daughter    • Heart disease Mother      Bypass   • Alzheimer's disease Mother    • Heart disease Father    • Diabetes Father    • Cancer Brother      Leukemia   • No Known Problems Maternal Grandmother    • No Known Problems Maternal Grandfather    • No Known Problems Paternal Grandmother    • No Known Problems Paternal Grandfather      Social History   Substance Use Topics   • Smoking status: Former Smoker     Packs/day: 3.00      Years: 38.00     Types: Cigarettes     Quit date: 9/23/1986   • Smokeless tobacco: Former User   • Alcohol use Yes       (Not in a hospital admission)    Current Facility-Administered Medications:   •  Insert peripheral IV, , , Once **AND** sodium chloride 0.9 % flush 10 mL, 10 mL, Intravenous, PRN, Akil Agarwal Jr., MD    Current Outpatient Prescriptions:   •  aspirin 81 MG EC tablet, Take 81 mg by mouth daily., Disp: , Rfl:   •  atenolol (TENORMIN) 25 MG tablet, TAKE 1 TABLET DAILY, Disp: 90 tablet, Rfl: 3  •  atorvastatin (LIPITOR) 10 MG tablet, TAKE 1 TABLET AT BEDTIME, Disp: 90 tablet, Rfl: 1  •  clopidogrel (PLAVIX) 75 MG tablet, Take 1 tablet by mouth Daily., Disp: 90 tablet, Rfl: 3  •  diclofenac-misoprostol (ARTHROTEC 75) 75-0.2 MG EC tablet, Take 1 tablet by mouth 2 (two) times a day., Disp: , Rfl:   •  febuxostat (ULORIC) 40 MG tablet, Take 40 mg by mouth daily., Disp: , Rfl:   •  folic acid (FOLVITE) 1 MG tablet, Take 1 mg by mouth daily., Disp: , Rfl:   •  gabapentin (NEURONTIN) 300 MG capsule, Take 300 mg by mouth 3 (three) times a day., Disp: , Rfl:   •  isosorbide mononitrate (IMDUR) 120 MG 24 hr tablet, Take 1 tablet by mouth 2 (Two) Times a Day., Disp: 180 tablet, Rfl: 3  •  levothyroxine (SYNTHROID) 125 MCG tablet, Take 1 tablet by mouth Daily. (Patient taking differently: Take 100 mcg by mouth Daily.), Disp: 90 tablet, Rfl: 3  •  losartan (COZAAR) 50 MG tablet, Take 100 mg by mouth Daily., Disp: , Rfl:   •  Multiple Vitamins-Minerals (MULTIVITAMIN ADULT PO), Take  by mouth., Disp: , Rfl:   •  ranolazine (RANEXA) 500 MG 12 hr tablet, Take 1,000 mg by mouth 2 (Two) Times a Day., Disp: , Rfl:   •  vitamin B-12 (CYANOCOBALAMIN) 1000 MCG tablet, Take 1,000 mcg by mouth daily., Disp: , Rfl:   •  vitamin E 400 UNIT capsule, Take 400 Units by mouth daily., Disp: , Rfl:   •  amLODIPine (NORVASC) 5 MG tablet, Take 1 tablet by mouth Daily., Disp: 30 tablet, Rfl: 11  Allergies:  Review of patient's  allergies indicates no known allergies.    Objective     Vital Signs  Heart Rate:  [65-66] 66  Resp:  [18] 18  BP: (77-78)/(40-41) 77/41    Physical Exam:   Physical Exam   Constitutional: He is oriented to person, place, and time. He appears well-developed and well-nourished. No distress.   HENT:   Head: Normocephalic and atraumatic.   Eyes: Pupils are equal, round, and reactive to light.   Neck: Normal range of motion. Neck supple. No JVD present. No thyromegaly present.   Cardiovascular: Normal rate, regular rhythm and intact distal pulses.  Exam reveals no gallop and no friction rub.    Murmur (2/6 systolic ) heard.  Pulses:       Dorsalis pedis pulses are 2+ on the right side, and 2+ on the left side.   Pulmonary/Chest: Effort normal and breath sounds normal. No respiratory distress. He has no wheezes. He has no rales. He exhibits no tenderness.   Abdominal: Soft. Bowel sounds are normal. He exhibits no distension. There is no tenderness.   Musculoskeletal: Normal range of motion. He exhibits no edema.   Neurological: He is alert and oriented to person, place, and time. No cranial nerve deficit.   Skin: Skin is warm and dry. He is not diaphoretic.   Psychiatric: He has a normal mood and affect. His behavior is normal.     Results Review:     Lab Results (last 72 hours)     Procedure Component Value Units Date/Time    CBC & Differential [[id number]] Collected:  04/13/17 1414    Specimen:  Blood Updated:  04/13/17 1425    Narrative:       The following orders were created for panel order CBC & Differential.  Procedure                               Abnormality         Status                     ---------                               -----------         ------                     CBC Auto Differential[[id number]]         Abnormal            Final result                 Please view results for these tests on the individual orders.    CBC Auto Differential [[id number]]  (Abnormal) Collected:  04/13/17 1414    Specimen:   Blood Updated:  04/13/17 1425     WBC 7.75 10*3/mm3      RBC 3.17 (L) 10*6/mm3      Hemoglobin 10.7 (L) g/dL      Hematocrit 31.5 (L) %      MCV 99.4 (H) fL      MCH 33.8 (H) pg      MCHC 34.0 g/dL      RDW 12.5 %      RDW-SD 46.2 fl      MPV 10.9 fL      Platelets 289 10*3/mm3      Neutrophil % 67.0 %      Lymphocyte % 18.6 %      Monocyte % 11.1 %      Eosinophil % 1.5 %      Basophil % 0.5 %      Immature Grans % 1.3 %      Neutrophils, Absolute 5.19 10*3/mm3      Lymphocytes, Absolute 1.44 10*3/mm3      Monocytes, Absolute 0.86 10*3/mm3      Eosinophils, Absolute 0.12 10*3/mm3      Basophils, Absolute 0.04 10*3/mm3      Immature Grans, Absolute 0.10 (H) 10*3/mm3     Comprehensive Metabolic Panel [[id number]]  (Abnormal) Collected:  04/13/17 1414    Specimen:  Blood Updated:  04/13/17 1435     Glucose 202 (H) mg/dL      BUN 43 (H) mg/dL      Creatinine 2.83 (H) mg/dL      Sodium 137 mmol/L      Potassium 5.3 mmol/L      Chloride 95 (L) mmol/L      CO2 29.0 mmol/L      Calcium 10.0 mg/dL      Total Protein 7.6 g/dL      Albumin 3.80 g/dL      ALT (SGPT) 18 U/L      AST (SGOT) 33 U/L      Alkaline Phosphatase 111 U/L      Total Bilirubin 0.8 mg/dL      eGFR Non African Amer 22 (L) mL/min/1.73      Globulin 3.8 gm/dL      A/G Ratio 1.0 (L) g/dL      BUN/Creatinine Ratio 15.2     Anion Gap 13.0 mmol/L     Narrative:       The MDRD GFR formula is only valid for adults with stable renal function between ages 18 and 70.    POC Troponin, Rapid [[id number]]  (Normal) Collected:  04/13/17 1423    Specimen:  Blood Updated:  04/13/17 1437     Troponin I 0.00 ng/mL       Serial Number: [id number]    : [id number]       D-dimer, Quantitative [[id number]]  (Abnormal) Collected:  04/13/17 1414    Specimen:  Blood Updated:  04/13/17 1442     D-Dimer, Quantitative 1.58 (H) mg/L (FEU)     Narrative:       Reference Range is 0-0.50 mg/L FEU. However, results <0.50 mg/L FEU tends to rule out DVT or PE. Results >0.50 mg/L FEU are not  useful in predicting absence or presence of DVT or PE.    BNP [[id number]]  (Normal) Collected:  04/13/17 1414    Specimen:  Blood Updated:  04/13/17 1443     proBNP 764.0 pg/mL           Assessment/Plan     Active Problems:    Hypotension  Acute kidney injury  Elevated d-dimer   Coronary artery disease  Mesothelioma  Chronic obstructive pulmonary disease    Plan-  Discussed with Dr. Britton- he will follow the patient as a consultant  Limited echo  Trend cardiac markers  Monitor telemetry  Hold antihypertensives  IV fluids  Continue asa, plavix  Further recommendations following Dr. Britton's evaluation of the patient     I discussed the patients findings and my recommendations with patient, family, nursing staff, primary care team and consulting provider.     Evelyne Morrison, APRN  04/13/17  3:52 PM

## 2017-04-13 NOTE — TELEPHONE ENCOUNTER
JUST ELBAI    PT CALLED STATING THAT HE IS INCREASINGLY WORSE & WEAK.    SAYS HE TALKED TO DR MELO LAST NIGHT WHO ADVISED HIM TO GO TO ER.    HE CALLED TO SEE IF HE COULD SEE YOU SOONER - I LET HIM KNOW THAT I COULD WORK HIM IN TO SEE YOU 4/18 - BUT THAT I WOULD ADVISE HE FOLLOW DR RADFORD ADVICE & BE SEEN IN ER.     HE STATES HIS BP WAS 80S/40S TODAY. HE IS CURRENTLY AT THE ONCOLOGISTS OFFICE & STATES HE WILL KEEP HIS SCHEDULED APPT ON 4/26 WITH YOU & COME TO OUR ER TODAY.

## 2017-04-13 NOTE — H&P
[facility]  HISTORY AND PHYSICAL    Date of Admission: 4/13/2017  Primary Care Physician: Bob Fulton MD    Subjective     Chief Complaint: weakness    History of Present Illness  This 76-year-old white male with known history of coronary artery disease, malignant mesothelioma, COPD, hypertension who has been recently being evaluated by Dr. Reba santos cardiologist, his oncologist in [place] and Dr. Hogue cardiothoracic surgery in regards to his ongoing minute mesothelioma as well as his known blockages per cardiac cath done about 6 weeks previously.  Patient states however over the past couple of weeks he has become progressively more weak, he states he has had a decrease in his appetite.  He has had decreased urine output, inability to ambulate without feeling significantly weak.  He has increased weakness with standing.  He has had some mild nausea however only a couple episodes of vomiting.  He has had no diarrhea rather constipation.  He has had no chest pain.  He states he has had about a 15 pound weight loss over the past few weeks.  Arrival to the ER patient is noted to be hypotensive 70s over 40s currently is 80/50.  He also has an increase in his creatinine at this time of 2.8 whereas recently he was 1.1.  A normal BNP and negative troponin and unremarkable chest x-ray.        Review of Systems     Otherwise complete ROS reviewed and negative except as mentioned in the HPI.      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   • Ocular histoplasmosis    • Thrombopenia 9/17/2016       Past  Surgical History:  Past Surgical History:   Procedure Laterality Date   • APPENDECTOMY     • BRONCHOSCOPY     • CARDIAC CATHETERIZATION     • CARDIAC CATHETERIZATION Left 2/23/2017    Procedure: Cardiac Catheterization/Vascular Study;  Surgeon: Tejinder Britton MD;  Location:  [facility] CATH INVASIVE LOCATION;  Service:    • CARDIAC CATHETERIZATION Right 2/23/2017    Procedure: Angioplasty-coronary;  Surgeon: Tejinder Britton MD;  Location:  [facility] CATH INVASIVE LOCATION;  Service:    • CORONARY STENT PLACEMENT     • ELBOW PROCEDURE Left    • EYE SURGERY     • HAND SURGERY Right    • THORACENTESIS         Social History:  reports that he quit smoking about 30 years ago. His smoking use included Cigarettes. He has a 114.00 pack-year smoking history. He has quit using smokeless tobacco. He reports that he drinks alcohol. He reports that he does not use illicit drugs.    Family History: family history includes Alzheimer's disease in his mother; Cancer in his brother; Diabetes in his father; Heart disease in his father and mother; No Known Problems in his daughter, maternal grandfather, maternal grandmother, paternal grandfather, and paternal grandmother.       Allergies:  No Known Allergies    Medications:  Prior to Admission medications    Medication Sig Start Date End Date Taking? Authorizing Provider   aspirin 81 MG EC tablet Take 81 mg by mouth daily.   Yes Historical Provider, MD   atenolol (TENORMIN) 25 MG tablet TAKE 1 TABLET DAILY 3/6/17  Yes Tejinder Britton MD   atorvastatin (LIPITOR) 10 MG tablet TAKE 1 TABLET AT BEDTIME 3/10/17  Yes Tejinder Britton MD   clopidogrel (PLAVIX) 75 MG tablet Take 1 tablet by mouth Daily. 3/15/17  Yes Tejinder Britton MD   diclofenac-misoprostol (ARTHROTEC 75) 75-0.2 MG EC tablet Take 1 tablet by mouth 2 (two) times a day.   Yes Historical Provider, MD   febuxostat (ULORIC) 40 MG tablet Take 40 mg by mouth daily.   Yes Historical Provider, MD   folic acid (FOLVITE) 1 MG tablet Take 1 mg by mouth daily.   " Yes Historical Provider, MD   gabapentin (NEURONTIN) 300 MG capsule Take 300 mg by mouth 3 (three) times a day.   Yes Historical Provider, MD   isosorbide mononitrate (IMDUR) 120 MG 24 hr tablet Take 1 tablet by mouth 2 (Two) Times a Day. 1/27/17  Yes Tejinder Britton MD   levothyroxine (SYNTHROID) 125 MCG tablet Take 1 tablet by mouth Daily.  Patient taking differently: Take 100 mcg by mouth Daily. 10/20/16  Yes BRAYDON Maradiaga   losartan (COZAAR) 50 MG tablet Take 100 mg by mouth Daily.   Yes Historical Provider, MD   Multiple Vitamins-Minerals (MULTIVITAMIN ADULT PO) Take  by mouth.   Yes Historical Provider, MD   ranolazine (RANEXA) 500 MG 12 hr tablet Take 1,000 mg by mouth 2 (Two) Times a Day.   Yes Historical Provider, MD   vitamin B-12 (CYANOCOBALAMIN) 1000 MCG tablet Take 1,000 mcg by mouth daily.   Yes Historical Provider, MD   vitamin E 400 UNIT capsule Take 400 Units by mouth daily.   Yes Historical Provider, MD   amLODIPine (NORVASC) 5 MG tablet Take 1 tablet by mouth Daily. 3/22/17   Tejinder Britton MD       Objective     Vital Signs: BP (!) 85/45  Pulse 66  Temp 97.9 °F (36.6 °C) (Oral)   Resp 19  Ht 68\" (172.7 cm)  Wt 215 lb (97.5 kg)  SpO2 94%  BMI 32.69 kg/m2  Physical Exam   Constitutional: He is oriented to person, place, and time. He appears well-developed and well-nourished.   HENT:   Head: Normocephalic and atraumatic.   Eyes: Conjunctivae and EOM are normal. Pupils are equal, round, and reactive to light.   Neck: Neck supple. No JVD present. No thyromegaly present.   Cardiovascular: Normal rate, regular rhythm and intact distal pulses.  Exam reveals no gallop and no friction rub.    Murmur heard.  Pulmonary/Chest: Effort normal and breath sounds normal. No respiratory distress. He has no wheezes. He has no rales. He exhibits no tenderness.   Abdominal: Soft. Bowel sounds are normal. He exhibits distension. There is no tenderness. There is no rebound and no guarding. "   Musculoskeletal: Normal range of motion. He exhibits no edema, tenderness or deformity.   Lymphadenopathy:     He has no cervical adenopathy.   Neurological: He is alert and oriented to person, place, and time. He displays normal reflexes. No cranial nerve deficit. He exhibits normal muscle tone.   Skin: Skin is warm and dry. No rash noted.   Psychiatric: He has a normal mood and affect. His behavior is normal. Judgment and thought content normal.           Results Reviewed:  Lab Results (last 24 hours)     Procedure Component Value Units Date/Time    CBC & Differential [[id number]] Collected:  04/13/17 1414    Specimen:  Blood Updated:  04/13/17 1425    Narrative:       The following orders were created for panel order CBC & Differential.  Procedure                               Abnormality         Status                     ---------                               -----------         ------                     CBC Auto Differential[[id number]]         Abnormal            Final result                 Please view results for these tests on the individual orders.    CBC Auto Differential [[id number]]  (Abnormal) Collected:  04/13/17 1414    Specimen:  Blood Updated:  04/13/17 1425     WBC 7.75 10*3/mm3      RBC 3.17 (L) 10*6/mm3      Hemoglobin 10.7 (L) g/dL      Hematocrit 31.5 (L) %      MCV 99.4 (H) fL      MCH 33.8 (H) pg      MCHC 34.0 g/dL      RDW 12.5 %      RDW-SD 46.2 fl      MPV 10.9 fL      Platelets 289 10*3/mm3      Neutrophil % 67.0 %      Lymphocyte % 18.6 %      Monocyte % 11.1 %      Eosinophil % 1.5 %      Basophil % 0.5 %      Immature Grans % 1.3 %      Neutrophils, Absolute 5.19 10*3/mm3      Lymphocytes, Absolute 1.44 10*3/mm3      Monocytes, Absolute 0.86 10*3/mm3      Eosinophils, Absolute 0.12 10*3/mm3      Basophils, Absolute 0.04 10*3/mm3      Immature Grans, Absolute 0.10 (H) 10*3/mm3     Comprehensive Metabolic Panel [[id number]]  (Abnormal) Collected:  04/13/17 1414    Specimen:  Blood  Updated:  04/13/17 1435     Glucose 202 (H) mg/dL      BUN 43 (H) mg/dL      Creatinine 2.83 (H) mg/dL      Sodium 137 mmol/L      Potassium 5.3 mmol/L      Chloride 95 (L) mmol/L      CO2 29.0 mmol/L      Calcium 10.0 mg/dL      Total Protein 7.6 g/dL      Albumin 3.80 g/dL      ALT (SGPT) 18 U/L      AST (SGOT) 33 U/L      Alkaline Phosphatase 111 U/L      Total Bilirubin 0.8 mg/dL      eGFR Non African Amer 22 (L) mL/min/1.73      Globulin 3.8 gm/dL      A/G Ratio 1.0 (L) g/dL      BUN/Creatinine Ratio 15.2     Anion Gap 13.0 mmol/L     Narrative:       The MDRD GFR formula is only valid for adults with stable renal function between ages 18 and 70.    POC Troponin, Rapid [[id number]]  (Normal) Collected:  04/13/17 1423    Specimen:  Blood Updated:  04/13/17 1437     Troponin I 0.00 ng/mL       Serial Number: [id number]    : [id number]       D-dimer, Quantitative [[id number]]  (Abnormal) Collected:  04/13/17 1414    Specimen:  Blood Updated:  04/13/17 1442     D-Dimer, Quantitative 1.58 (H) mg/L (FEU)     Narrative:       Reference Range is 0-0.50 mg/L FEU. However, results <0.50 mg/L FEU tends to rule out DVT or PE. Results >0.50 mg/L FEU are not useful in predicting absence or presence of DVT or PE.    BNP [[id number]]  (Normal) Collected:  04/13/17 1414    Specimen:  Blood Updated:  04/13/17 1443     proBNP 764.0 pg/mL         Imaging Results (last 24 hours)     Procedure Component Value Units Date/Time    XR Chest 1 View [[id number]] Collected:  04/13/17 1533     Updated:  04/13/17 1538    Narrative:       EXAMINATION: XR CHEST 1 VW-     4/13/2017 3:07 PM EDT     HISTORY: SOB     Portable chest x-ray compared with 07/27/2015.     Stable heart and mediastinum.  Unchanged left chest wall port.  The right lung is adequately expanded and clear.     There is chronic left lower lobe infiltrate and loculated pleural fluid  which was noted on a chest CT from 01/10/2017 and is not significantly  changed in appearance as  compared with 07/27/2015.     Summary:  1. Chronic lower left hemithorax opacification.  2. No significant change.        This report was finalized on 04/13/2017 15:35 by Dr. Doyle Lambert MD.          I have personally reviewed and interpreted the radiology studies and ECG obtained at time of admission.     Assessment / Plan     Assessment & Plan  Hospital Problem List     Hypotension        1. Hypotension-likely medication induced along with decreased appetite, mild dehydration, and some hypovolemia  2. Weakness-likely related to his multiple medical conditions but worsened by his hypotension  3. CAD with known blockage-CT surgery discussing surgery. Family very much desires surgery. Dr. Hogue to see while here.  Cath in February demonstrates any percent stenosis of RCA with 60-70% stenosis of left circumflex.  Echo at that time had an EF of 55% with mild diastolic impairment  4. LEANNA-likely related to hypotension, dehydration, on ARB and other offending agents. Hold ARB, hold neurontin, hold other bp meds. Give fluids. F/u labs. Check renal US. If no improvement consider renal consult  5. hypothryoidsim-cont synthroid  6. History of mesothelioma -managed by oncology in [place]. Currently Entruda being held in anticipation of possible surgery       Code Status: full     I discussed the patients findings and my recommendations with patient, wife joanne collierer    Estimated length of stay 2-3 days    Krystle Whittaker MD   04/13/17   3:57 PM

## 2017-04-14 NOTE — PROGRESS NOTES
Discharge Planning Assessment   Natalya     Patient Name: Kvng Nolan  MRN: [id number]  Today's Date: 4/14/2017    Admit Date: 4/13/2017          Discharge Needs Assessment       04/14/17 1050    Living Environment    Lives With spouse    Living Arrangements house    Provides Primary Care For no one    Primary Care Provided By spouse/significant other    Quality Of Family Relationships supportive    Able to Return to Prior Living Arrangements yes    Discharge Needs Assessment    Concerns To Be Addressed no discharge needs identified    Equipment Currently Used at Home oxygen   [facility]    Discharge Planning Comments LIVES WITH SPOUSE; HOME O2 WITH [facility]; REFUSED HOME HEALTH INDEPENDENT AT HOME            Discharge Plan     None        Discharge Placement     No information found                Demographic Summary     None            Functional Status     None            Psychosocial     None            Abuse/Neglect     None            Legal     None            Substance Abuse     None            Patient Forms     None          Leann Mancera RN

## 2017-04-14 NOTE — PROGRESS NOTES
[facility] -  Progress Note     LOS: 1 day   Patient Care Team:  Bob Fulton MD as PCP - General  Bob Fulton MD as PCP - Family Medicine  Viktoria Jolley MD as PCP - Claims Attributed - PLEASE DO NOT REMOVE  Tejinder Britton MD as Cardiologist (Cardiology)    Chief Complaint:Weakness/Fatigue    Subjective     Interval History:   Patient feels much better. Regaining strength. Denies chest pain.     Review of Systems:   Review of Systems   Constitution: Positive for weakness and malaise/fatigue.   Cardiovascular: Negative for chest pain, dyspnea on exertion and leg swelling.   Respiratory: Negative for shortness of breath.    Gastrointestinal: Negative for diarrhea and vomiting.   Neurological: Positive for dizziness.        Objective     Vital Sign Min/Max for last 24 hours  Temp  Min: 97.6 °F (36.4 °C)  Max: 98.6 °F (37 °C)   BP  Min: 90/55  Max: 118/73   Pulse  Min: 58  Max: 99   Resp  Min: 18  Max: 20   SpO2  Min: 92 %  Max: 100 %   Flow (L/min)  Min: 2  Max: 2   Weight  Min: 215 lb (97.5 kg)  Max: 215 lb 3 oz (97.6 kg)     Last Weight    04/14/17  1400   Weight: 215 lb (97.5 kg)         Intake/Output Summary (Last 24 hours) at 04/14/17 1603  Last data filed at 04/14/17 1246   Gross per 24 hour   Intake          1808.77 ml   Output              750 ml   Net          1058.77 ml         Physical Exam:  Physical Exam   Constitutional: He is oriented to person, place, and time. He appears well-developed and well-nourished.   HENT:   Head: Normocephalic and atraumatic.   Eyes: Pupils are equal, round, and reactive to light.   Neck: Normal range of motion. Neck supple. No JVD present. Carotid bruit is not present.   Cardiovascular: Normal rate, regular rhythm, normal heart sounds and intact distal pulses.    Pulmonary/Chest: Effort normal and breath sounds normal.   Abdominal: Soft. Bowel sounds are normal.   Musculoskeletal: Normal range of motion.   Neurological: He is  alert and oriented to person, place, and time. He has normal reflexes.   Skin: Skin is warm and dry.   Psychiatric: He has a normal mood and affect. His behavior is normal. Judgment and thought content normal.        Results Review:   Lab Results (last 72 hours)     Procedure Component Value Units Date/Time    CBC & Differential [[id number]] Collected:  04/13/17 1414    Specimen:  Blood Updated:  04/13/17 1425    Narrative:       The following orders were created for panel order CBC & Differential.  Procedure                               Abnormality         Status                     ---------                               -----------         ------                     CBC Auto Differential[[id number]]         Abnormal            Final result                 Please view results for these tests on the individual orders.    CBC Auto Differential [[id number]]  (Abnormal) Collected:  04/13/17 1414    Specimen:  Blood Updated:  04/13/17 1425     WBC 7.75 10*3/mm3      RBC 3.17 (L) 10*6/mm3      Hemoglobin 10.7 (L) g/dL      Hematocrit 31.5 (L) %      MCV 99.4 (H) fL      MCH 33.8 (H) pg      MCHC 34.0 g/dL      RDW 12.5 %      RDW-SD 46.2 fl      MPV 10.9 fL      Platelets 289 10*3/mm3      Neutrophil % 67.0 %      Lymphocyte % 18.6 %      Monocyte % 11.1 %      Eosinophil % 1.5 %      Basophil % 0.5 %      Immature Grans % 1.3 %      Neutrophils, Absolute 5.19 10*3/mm3      Lymphocytes, Absolute 1.44 10*3/mm3      Monocytes, Absolute 0.86 10*3/mm3      Eosinophils, Absolute 0.12 10*3/mm3      Basophils, Absolute 0.04 10*3/mm3      Immature Grans, Absolute 0.10 (H) 10*3/mm3     Comprehensive Metabolic Panel [[id number]]  (Abnormal) Collected:  04/13/17 1414    Specimen:  Blood Updated:  04/13/17 1435     Glucose 202 (H) mg/dL      BUN 43 (H) mg/dL      Creatinine 2.83 (H) mg/dL      Sodium 137 mmol/L      Potassium 5.3 mmol/L      Chloride 95 (L) mmol/L      CO2 29.0 mmol/L      Calcium 10.0 mg/dL      Total Protein 7.6 g/dL       Albumin 3.80 g/dL      ALT (SGPT) 18 U/L      AST (SGOT) 33 U/L      Alkaline Phosphatase 111 U/L      Total Bilirubin 0.8 mg/dL      eGFR Non African Amer 22 (L) mL/min/1.73      Globulin 3.8 gm/dL      A/G Ratio 1.0 (L) g/dL      BUN/Creatinine Ratio 15.2     Anion Gap 13.0 mmol/L     Narrative:       The MDRD GFR formula is only valid for adults with stable renal function between ages 18 and 70.    POC Troponin, Rapid [[id number]]  (Normal) Collected:  04/13/17 1423    Specimen:  Blood Updated:  04/13/17 1437     Troponin I 0.00 ng/mL       Serial Number: [id number]    : [id number]       D-dimer, Quantitative [[id number]]  (Abnormal) Collected:  04/13/17 1414    Specimen:  Blood Updated:  04/13/17 1442     D-Dimer, Quantitative 1.58 (H) mg/L (FEU)     Narrative:       Reference Range is 0-0.50 mg/L FEU. However, results <0.50 mg/L FEU tends to rule out DVT or PE. Results >0.50 mg/L FEU are not useful in predicting absence or presence of DVT or PE.    BNP [[id number]]  (Normal) Collected:  04/13/17 1414    Specimen:  Blood Updated:  04/13/17 1443     proBNP 764.0 pg/mL     CK [[id number]]  (Normal) Collected:  04/13/17 1414    Specimen:  Blood Updated:  04/13/17 1627     Creatine Kinase 31 U/L     CK-MB [[id number]]  (Normal) Collected:  04/13/17 1414    Specimen:  Blood Updated:  04/13/17 1627     CKMB 0.67 ng/mL     Narrative:       CKMB Index not indicated    Myoglobin, Serum [[id number]]  (Normal) Collected:  04/13/17 1414    Specimen:  Blood Updated:  04/13/17 1627     Myoglobin 102.1 ng/mL     CBC & Differential [[id number]] Collected:  04/13/17 1643    Specimen:  Blood Updated:  04/13/17 1717    Narrative:       The following orders were created for panel order CBC & Differential.  Procedure                               Abnormality         Status                     ---------                               -----------         ------                     CBC Auto Differential[[id number]]         Abnormal             Final result                 Please view results for these tests on the individual orders.    CBC Auto Differential [[id number]]  (Abnormal) Collected:  04/13/17 1643    Specimen:  Blood Updated:  04/13/17 1717     WBC 7.76 10*3/mm3      RBC 2.95 (L) 10*6/mm3      Hemoglobin 9.8 (L) g/dL      Hematocrit 29.5 (L) %      .0 (H) fL      MCH 33.2 (H) pg      MCHC 33.2 g/dL      RDW 12.7 %      RDW-SD 46.2 fl      MPV 11.0 fL      Platelets 276 10*3/mm3      Neutrophil % 59.6 %      Lymphocyte % 18.9 %      Monocyte % 17.7 (H) %      Eosinophil % 2.1 %      Basophil % 0.4 %      Immature Grans % 1.3 %      Neutrophils, Absolute 4.63 10*3/mm3      Lymphocytes, Absolute 1.47 10*3/mm3      Monocytes, Absolute 1.37 (H) 10*3/mm3      Eosinophils, Absolute 0.16 10*3/mm3      Basophils, Absolute 0.03 10*3/mm3      Immature Grans, Absolute 0.10 (H) 10*3/mm3     Basic Metabolic Panel [[id number]]  (Abnormal) Collected:  04/13/17 1643    Specimen:  Blood Updated:  04/13/17 1721     Glucose 175 (H) mg/dL      BUN 42 (H) mg/dL      Creatinine 2.83 (H) mg/dL      Sodium 138 mmol/L      Potassium 4.8 mmol/L      Chloride 97 (L) mmol/L      CO2 27.0 mmol/L      Calcium 9.6 mg/dL      eGFR Non African Amer 22 (L) mL/min/1.73      BUN/Creatinine Ratio 14.8     Anion Gap 14.0 (H) mmol/L     Narrative:       The MDRD GFR formula is only valid for adults with stable renal function between ages 18 and 70.    Troponin [[id number]]  (Normal) Collected:  04/13/17 1643    Specimen:  Blood Updated:  04/13/17 1730     Troponin I <0.012 ng/mL     Urinalysis With / Culture If Indicated [[id number]]  (Normal) Collected:  04/13/17 2232    Specimen:  Urine from Urine, Clean Catch Updated:  04/13/17 2240     Color, UA Yellow     Appearance, UA Clear     pH, UA 5.5     Specific Gravity, UA 1.010     Glucose, UA Negative     Ketones, UA Negative     Bilirubin, UA Negative     Blood, UA Negative     Protein, UA Negative     Leuk Esterase, UA Negative      Nitrite, UA Negative     Urobilinogen, UA 0.2 E.U./dL    Narrative:       Urine microscopic not indicated.    Troponin [[id number]]  (Normal) Collected:  04/13/17 2211    Specimen:  Blood Updated:  04/14/17 0028     Troponin I <0.012 ng/mL     CBC & Differential [[id number]] Collected:  04/14/17 0427    Specimen:  Blood Updated:  04/14/17 0502    Narrative:       The following orders were created for panel order CBC & Differential.  Procedure                               Abnormality         Status                     ---------                               -----------         ------                     CBC Auto Differential[[id number]]         Abnormal            Final result                 Please view results for these tests on the individual orders.    CBC Auto Differential [[id number]]  (Abnormal) Collected:  04/14/17 0427    Specimen:  Blood Updated:  04/14/17 0502     WBC 6.81 10*3/mm3      RBC 2.95 (L) 10*6/mm3      Hemoglobin 9.8 (L) g/dL      Hematocrit 29.7 (L) %      .7 (H) fL      MCH 33.2 (H) pg      MCHC 33.0 g/dL      RDW 12.7 %      RDW-SD 46.7 fl      MPV 10.9 fL      Platelets 271 10*3/mm3      Neutrophil % 53.7 %      Lymphocyte % 24.4 %      Monocyte % 17.8 (H) %      Eosinophil % 2.9 %      Basophil % 0.6 %      Immature Grans % 0.6 %      Neutrophils, Absolute 3.66 10*3/mm3      Lymphocytes, Absolute 1.66 10*3/mm3      Monocytes, Absolute 1.21 10*3/mm3      Eosinophils, Absolute 0.20 10*3/mm3      Basophils, Absolute 0.04 10*3/mm3      Immature Grans, Absolute 0.04 (H) 10*3/mm3     Basic Metabolic Panel [[id number]]  (Abnormal) Collected:  04/14/17 0427    Specimen:  Blood Updated:  04/14/17 0507     Glucose 127 (H) mg/dL      BUN 36 (H) mg/dL      Creatinine 2.15 (H) mg/dL      Sodium 139 mmol/L      Potassium 4.9 mmol/L      Chloride 101 mmol/L      CO2 29.0 mmol/L      Calcium 9.4 mg/dL      eGFR Non African Amer 30 (L) mL/min/1.73      BUN/Creatinine Ratio 16.7     Anion Gap 9.0 mmol/L      Narrative:       The MDRD GFR formula is only valid for adults with stable renal function between ages 18 and 70.    Troponin [[id number]]  (Normal) Collected:  04/14/17 0427    Specimen:  Blood Updated:  04/14/17 0518     Troponin I <0.012 ng/mL         Echo EF Estimated  Lab Results   Component Value Date    ECHOEFEST 55 02/24/2017     Cath Ejection Fraction Quantitative  No results found for: CATHEF    Medication Review: yes  Current Facility-Administered Medications   Medication Dose Route Frequency Provider Last Rate Last Dose   • acetaminophen (TYLENOL) tablet 650 mg  650 mg Oral Q4H PRN Krystle Whittaker MD       • aspirin EC tablet 81 mg  81 mg Oral Daily Krystle Whittaker MD   81 mg at 04/14/17 0813   • atenolol (TENORMIN) tablet 25 mg  25 mg Oral Q24H Jorge Go MD       • atorvastatin (LIPITOR) tablet 10 mg  10 mg Oral Nightly Jorge Go MD       • clopidogrel (PLAVIX) tablet 75 mg  75 mg Oral Q PM Krystle Whittaker MD   75 mg at 04/13/17 1748   • enoxaparin (LOVENOX) syringe 30 mg  30 mg Subcutaneous Q24H Krystle Whittaker MD   30 mg at 04/13/17 1747   • levothyroxine (SYNTHROID, LEVOTHROID) tablet 125 mcg  125 mcg Oral Q AM Krystle Whittaker MD   125 mcg at 04/14/17 0622   • ondansetron (ZOFRAN) tablet 4 mg  4 mg Oral Q6H PRN Krystle Whittaker MD       • sennosides-docusate sodium (SENOKOT-S) 8.6-50 MG tablet 2 tablet  2 tablet Oral BID Krystle Whittaker MD   2 tablet at 04/14/17 0813   • sodium chloride 0.9 % flush 1-10 mL  1-10 mL Intravenous PRN Krystle Whittaker MD       • sodium chloride 0.9 % flush 10 mL  10 mL Intravenous PRN Akil Agarwal Jr., MD       • sodium chloride 0.9 % infusion  100 mL/hr Intravenous Continuous Krystle Whittaker  mL/hr at 04/14/17 1315 100 mL/hr at 04/14/17 1315     Assessment/Plan     Active Problems:    Hypotension    Dehydration/Hypovolemia    History of Coronary Artery Disease    Acute Kidney Injury-Improving     Weakness  Plan:    Continue telemetry    IV Fluids    CTS following    Echo pending    Aspirin, Plavix, Lipitor    Agree with Resuming Tenormin, close monitoring of vitals    Ambulate    Will defer workup for d-dimer to primary-consider VQ scan due to LEANNA  Further orders per Dr. Reba Lyman, APRN  04/14/17  4:03 PM

## 2017-04-14 NOTE — PLAN OF CARE
Problem: Patient Care Overview (Adult)  Goal: Plan of Care Review  Outcome: Ongoing (interventions implemented as appropriate)    04/14/17 0652   Coping/Psychosocial Response Interventions   Plan Of Care Reviewed With patient   Patient Care Overview   Progress no change   Outcome Evaluation   Outcome Summary/Follow up Plan IVF as ordered. Urine sent to lab. voiding per urinal         Problem: Fluid Volume Deficit (Adult)  Goal: Identify Related Risk Factors and Signs and Symptoms  Outcome: Outcome(s) achieved Date Met:  04/14/17  Goal: Fluid/Electrolyte Balance  Outcome: Ongoing (interventions implemented as appropriate)  Goal: Comfort/Well Being  Outcome: Ongoing (interventions implemented as appropriate)    Problem: Renal Failure/Kidney Injury, Acute (Adult)  Goal: Signs and Symptoms of Listed Potential Problems Will be Absent or Manageable (Renal Failure/Kidney Injury, Acute)  Outcome: Ongoing (interventions implemented as appropriate)

## 2017-04-14 NOTE — PROGRESS NOTES
"    [facility] Medicine Services  INPATIENT PROGRESS NOTE    Length of Stay: 1  Date of Admission: 4/13/2017  Primary Care Physician: Bob Fulton MD    Subjective   Chief Complaint:  Follow up  HPI   76-year-old  man admitted by Dr. Whittaker yesterday for hypotension patient was felt to be cute kidney injury sec to dehydration/ hypovolemia associated with decreased appetite it appears that this may have led him to be generally weak.  Reviewing the admitting H&P,  patient has significant history including coronary artery disease,  malignant mesothelioma, chronic obstructive pulmonary disease, hypertension  His chest x-ray is described as chronic lower left hemithorax opacification out significant change  Urinating better  Appetite improve  \"feeling better\"  \"blood pressure  Improve in the 100's\"  Just started on oxygen last weak by Dr. Fulton's APRN  Uses it continuously at 2lpm  All antihtn medications were discontinued.  His blood pressure is  systolic  Review of Systems   No nausea or vomiting, no chest  Pain,   Short winded with minimal activities  Had walked in the hallway and been able to get to bathroom    All pertinent negatives and positives are as above. All other systems have been reviewed and are negative unless otherwise stated.     Objective    Temp:  [97.6 °F (36.4 °C)-98.6 °F (37 °C)] 97.6 °F (36.4 °C)  Heart Rate:  [58-99] 76  Resp:  [18-20] 20  BP: ()/(45-73) 102/52  Physical Exam  Look sbetter  Constitutional: He is oriented to person, place, and time. He appears well-developed and well-nourished.   HENT:   Head: Normocephalic and atraumatic.   Eyes: Conjunctivae and EOM are normal. Pupils are equal, round,  Neck: Neck supple. No JVD present. No thyromegaly present.   Cardiovascular: Normal rate, regular rhythm and intact distal pulses. Exam reveals no gallop and no friction rub.   Murmur heard.  Pulmonary/Chest: Effort normal and " breath sounds normal. No respiratory distress. He has no wheezes. He has no rales. He exhibits no tenderness.   Abdominal: Soft. Bowel sounds are normal. He exhibits  No significant distension. There is no tenderness. There is no rebound and no guarding.   Musculoskeletal: Normal range of motion. He exhibits no edema, tenderness or deformity.   Lymphadenopathy:   He has no cervical adenopathy.   Neurological: He is alert and oriented to person, place, and time. He displays normal reflexes. No cranial nerve deficit. He exhibits normal muscle tone.   Skin: Skin is warm and dry. No rash noted.   Psychiatric: He has a normal mood and affect. His behavior is normal. Judgment and thought content normal.              Results Review:  I have reviewed the labs, radiology results, and diagnostic studies.    Laboratory Data:     Results from last 7 days  Lab Units 04/14/17 0427 04/13/17  1643 04/13/17  1414   WBC 10*3/mm3 6.81 7.76 7.75   HEMOGLOBIN g/dL 9.8* 9.8* 10.7*   HEMATOCRIT % 29.7* 29.5* 31.5*   PLATELETS 10*3/mm3 271 276 289          Results from last 7 days  Lab Units 04/14/17 0427 04/13/17  1643 04/13/17  1414   SODIUM mmol/L 139 138 137   POTASSIUM mmol/L 4.9 4.8 5.3   CHLORIDE mmol/L 101 97* 95*   TOTAL CO2 mmol/L 29.0 27.0 29.0   BUN mg/dL 36* 42* 43*   CREATININE mg/dL 2.15* 2.83* 2.83*   CALCIUM mg/dL 9.4 9.6 10.0   BILIRUBIN mg/dL  --   --  0.8   ALK PHOS U/L  --   --  111   ALT (SGPT) U/L  --   --  18   AST (SGOT) U/L  --   --  33   GLUCOSE mg/dL 127* 175* 202*       Culture Data:        Radiology Data:   Imaging Results (last 24 hours)     Procedure Component Value Units Date/Time    XR Chest 1 View [[id number]] Collected:  04/13/17 1533     Updated:  04/13/17 1538    Narrative:       EXAMINATION: XR CHEST 1 VW-     4/13/2017 3:07 PM EDT     HISTORY: SOB     Portable chest x-ray compared with 07/27/2015.     Stable heart and mediastinum.  Unchanged left chest wall port.  The right lung is adequately  expanded and clear.     There is chronic left lower lobe infiltrate and loculated pleural fluid  which was noted on a chest CT from 01/10/2017 and is not significantly  changed in appearance as compared with 07/27/2015.     Summary:  1. Chronic lower left hemithorax opacification.  2. No significant change.        This report was finalized on 04/13/2017 15:35 by Dr. Doyle Lambert MD.    US Renal Limited [[id number]] Collected:  04/14/17 1355     Updated:  04/14/17 1420    Narrative:       EXAMINATION:   US RENAL LIMITED-  4/14/2017 1:53 PM CDT     HISTORY: Acute kidney injury     Images are obtained transverse and longitudinal direction in the usual  manner. Right renal contour is 4.6 x 5.5 cm in diameter. Right kidney is  11.2 cm in sagittal length.     There is a 1.6 cm cyst upper pole of the right kidney. There is no  evidence of a mass or hydronephrosis. Color flow is present.     Left renal contour is 4.9 to 6.9 cm in diameter and 12 cm in sagittal  length. There are no solid or cystic lesions.     The bladder is unremarkable.       Impression:       1.6 cm cyst upper pole of the right kidney.  This report was finalized on 04/14/2017 14:17 by Dr. Eduar Vera MD.          I have reviewed the patient current medications.     Assessment/Plan     Hospital Problem List     Hypotension        Problem list   hypotension  Weakness  Coronary artery disease with known blockage - cardiac catheterization in February reported to demonstrate gnosis of the RCA with 60-70% stenosis of the left circumflex.  Acute kidney injury improving  Dehydration/hypovolemia  History of mesothelioma  Hypothyroidism      Appreciate consultants -  No plans for cabg according to patient's understanding  Cont IVF   F/u labs in AM  Increase activities  If remain stable and further improve in renal status, I anticipate home soon.   Optimized treatment according to cardiologist  Pending echo report - but EF noted at 60%          Jorge Rialondrao  MD [place]   04/14/17   3:25 PM

## 2017-04-14 NOTE — PROGRESS NOTES
Acute Care - Physical Therapy Initial Evaluation  [facility]     Patient Name: Kvng Nolan  : 1940  MRN: [id number]  Today's Date: 2017   Onset of Illness/Injury or Date of Surgery Date: 17  Date of Referral to PT: 17  Referring Physician: Dr. Whittaker (generalized weakness)      Admit Date: 2017     Visit Dx:    ICD-10-CM ICD-9-CM   1. Hypotension, unspecified hypotension type I95.9 458.9   2. Acute renal failure, unspecified acute renal failure type N17.9 584.9   3. Impaired functional mobility and activity tolerance Z74.09 V49.89     Patient Active Problem List   Diagnosis   • Malignant neoplasm of pleura   • Mesothelioma of pleura   • Thrombopenia   • Encounter for antineoplastic chemotherapy   • COPD (chronic obstructive pulmonary disease)   • Essential hypertension   • Drug-induced hypothyroidism   • Low back pain without sciatica   • Chest pain on exertion   • Ischemic chest pain   • Coronary artery disease of native artery of native heart with stable angina pectoris   • Hypotension     Past Medical History:   Diagnosis Date   • Arthritis    • Cataract    • Cervical vertebral fracture    • COPD (chronic obstructive pulmonary disease)    • Coronary artery disease    • Encounter for antineoplastic chemotherapy 2016   • Gout    • Hyperlipidemia    • Hypertension    • Malignant neoplasm of pleura 2016   • Mesothelioma    • Mesothelioma of pleura 2016   • Ocular histoplasmosis    • Thrombopenia 2016     Past Surgical History:   Procedure Laterality Date   • APPENDECTOMY     • CARDIAC CATHETERIZATION     • CARDIAC CATHETERIZATION Left 2017    Procedure: Cardiac Catheterization/Vascular Study;  Surgeon: Tejinder Britton MD;  Location: [facility] CATH INVASIVE LOCATION;  Service:    • CARDIAC CATHETERIZATION Right 2017    Procedure: Angioplasty-coronary;  Surgeon: Tejinder Britton MD;  Location: [facility] CATH INVASIVE LOCATION;  Service:    • CORONARY STENT PLACEMENT      • ELBOW PROCEDURE Left    • EYE SURGERY     • HAND SURGERY Right    • THORACENTESIS            PT ASSESSMENT (last 72 hours)      PT Evaluation       04/14/17 1200 04/14/17 1050    General Information    Equipment Currently Used at Home  oxygen   AT HOME MEDICAL  -KR    Living Environment    Lives With spouse  -AO spouse  -KR    Living Arrangements house  -AO house  -KR    Home Accessibility bed and bath on same level  -AO     Number of Stairs to Enter Home 2  -AO     Stair Railings at Home none  -AO     Type of Financial/Environmental Concern none  -AO     Transportation Available car;family or friend will provide  -AO       04/14/17 0958 04/13/17 1712    Rehab Evaluation    Document Type evaluation   See MAR  -MILTON (r) TS (t) MILTON (c)     Subjective Information agree to therapy;complains of;weakness;fatigue  -MILTON (r) TS (t) MILTON (c)     General Information    Patient Profile Review yes  -MILTON (r) TS (t) MILTON (c)     Onset of Illness/Injury or Date of Surgery Date 04/13/17  -MILTON (r) TS (t) MILTON (c)     Referring Physician Dr. Whittaker   generalized weakness  -MILTON (r) TS (t) MILTON (c)     General Observations fowlers, spouse in room, IV site, NC/O2, continuous pulse ox  -MILTON (r) TS (t) MILTON (c)     Pertinent History Of Current Problem Pt admitted with CC of weakness and decreased apetite, hypotensive in ER being 70s/40s; PMH: CAD, mesothelioma, COPD, hypertension; 4/13 CXR chronic L lower hemithorax opacification  -MILTON (r) TS (t) MILTON (c)     Precautions/Limitations fall precautions  -MILTON (r) TS (t) MILTON (c)     Prior Level of Function independent:;all household mobility;community mobility;bathing;dressing;ADL's  -MILTON (r) TS (t) MILTON (c)     Equipment Currently Used at Home oxygen  -MILTON (r) TS (t) MILTON (c) oxygen  -HA    Plans/Goals Discussed With patient and family;agreed upon  -MILTON (r) TS (t) MILTON (c)     Risks Reviewed patient and family:;LOB;nausea/vomiting;dizziness;lines disloged;change in vital signs  -MILTON (r) TS (t) MILTON (c)     Benefits Reviewed  patient and family:;improve function;increase independence;increase strength;increase balance  -MILTON (r) TS (t) MILTON (c)     Barriers to Rehab medically complex  -MILTON (r) SAMMI (t) MILTON (c)     Living Environment    Lives With spouse  -MILTON (r) SAMMI (t) MILTON (c)     Living Arrangements house  -MILTON (r) SAMMI (t) MILTON (c)     Home Accessibility bed and bath on same level;stairs to enter home;tub/shower is not walk in  -MILTON (r) TS (t) MILTON (c)     Number of Stairs to Enter Home 2  -MILTON (r) TS (t) MILTON (c)     Stair Railings at Home none  -MILTON (r) TS (t) MILTON (c)     Living Environment Comment Pt just recently began using oxygen at home  -MILTON (r) SAMMI (t) MILTON (c)     Clinical Impression    Date of Referral to PT 04/13/17  -MILTON (r) SAMMI (t) MILTON (c)     PT Diagnosis Impaired mobility  -MILTON (r) SAMMI (t) MILTON (c)     Functional Level At Time Of Evaluation Ambulated 400' with CGA/supervision  -MILTON (r) SAMMI (t) MILTON (c)     Patient/Family Goals Statement to get home  -MILTON (r) SAMMI (t) MILTON (c)     Criteria for Skilled Therapeutic Interventions Met yes;treatment indicated  -MILTON (r) SAMMI (t) MILTON (c)     Impairments Found (describe specific impairments) aerobic capacity/endurance;gait, locomotion, and balance  -MILTON (r) TS (t) MILTON (c)     Functional Limitations in Following Categories (Describe Specific Limitations) community/leisure;home management  -MILTON (r) SAMMI (t) MILTON (c)     Disability: Inability to Perform Actions/Activities of Required Roles (describe specific disability) community/leisure  -MILTON (r) TS (t) MILTON (c)     Rehab Potential good, to achieve stated therapy goals  -MILTON (r) TS (t) MILTON (c)     Predicted Duration of Therapy Intervention (days/wks) until d/c  -MILTON (r) TS (t) MILTON (c)     Vital Signs    Pre Systolic BP Rehab 120  -MILTON (r) TS (t) MILTON (c)     Pre Treatment Diastolic BP 60  -MILTON (r) TS (t) MILTON (c)     Pretreatment Heart Rate (beats/min) 73  -MILTON (r) TS (t) MILTON (c)     Posttreatment Heart Rate (beats/min) 84  -MILTON (r) TS (t) MILTON (c)     Pre SpO2 (%) 96  -MILTON (r) TS (t) MILTON (c)     O2  Delivery Pre Treatment supplemental O2   2L  -MILTON (r) TS (t) IMLTON (c)     Intra SpO2 (%) 90  -MILTON (r) TS (t) MILTON (c)     O2 Delivery Intra Treatment supplemental O2   2L  -MILTON (r) TS (t) MILTON (c)     Post SpO2 (%) 92  -MILTON (r) TS (t) MILTON (c)     O2 Delivery Post Treatment supplemental O2   2L  -MILTON (r) TS (t) MILTON (c)     Pre Patient Position --   fowlers  -MILTON (r) TS (t) MILTON (c)     Intra Patient Position Standing  -MILTON (r) TS (t) MILTON (c)     Post Patient Position --   fowlers  -MILTON (r) TS (t) MILTON (c)     Pain Assessment    Pain Assessment No/denies pain  -MILTON (r) TS (t) MILTON (c)     Vision Assessment/Intervention    Visual Impairment WFL   per pt  -MILTON (r) TS (t) MILTON (c)     Cognitive Assessment/Intervention    Current Cognitive/Communication Assessment functional  -MILTON (r) TS (t) MILTON (c)     Orientation Status oriented x 4  -MILTON (r) TS (t) MILTON (c)     Follows Commands/Answers Questions 100% of the time;able to follow multi-step instructions  -MILTON (r) TS (t) MILTON (c)     Personal Safety decreased awareness, need for assist;decreased awareness, need for safety  -MILTON (r) TS (t) MILTON (c)     Personal Safety Interventions fall prevention program maintained;gait belt;nonskid shoes/slippers when out of bed;supervised activity  -MILTON (r) TS (t) MILTON (c)     ROM (Range of Motion)    General ROM Detail BUE WFL; BLE WFL  -MILTON (r) TS (t) MILTON (c)     MMT (Manual Muscle Testing)    General MMT Assessment Detail BLE 4+/5  -MILTON (r) TS (t) MILTON (c)     Bed Mobility, Assessment/Treatment    Bed Mob, Supine to Sit, [place] supervision required  -MILTON (r) TS (t) MILTON (c)     Bed Mob, Sit to Supine, [place] supervision required  -MILTON (r) TS (t) MILTON (c)     Bed Mobility, Impairments strength decreased;impaired balance  -MILTON (r) TS (t) MILTON (c)     Transfer Assessment/Treatment    Transfers, Sit-Stand [place] contact guard assist  -MILTON (r) TS (t) MILTON (c)     Transfers, Stand-Sit [place] supervision required  -MILTON (r) SAMMI (t) MILTON (c)     Transfer, Safety Issues step length  decreased  -MILTON (r) TS (t) MILTON (c)     Transfer, Impairments strength decreased;impaired balance  -MILTON (r) TS (t) MILTON (c)     Gait Assessment/Treatment    Gait, [place] Level verbal cues required;contact guard assist;stand by assist  -MILTON (r) TS (t) MILTON (c)     Gait, Distance (Feet) 200   standing rest break, 200', total 400' gait  -MILTON (r) TS (t) MILTON (c)     Gait, Gait Pattern Analysis swing-through gait  -MILTON (r) TS (t) MILTON (c)     Gait, Gait Deviations chavez decreased;step length decreased;stride length decreased;narrow base  -MILTON (r) TS (t) MILTON (c)     Gait, Safety Issues supplemental O2;step length decreased  -MILTON (r) TS (t) MILTON (c)     Gait, Impairments strength decreased;impaired balance  -MILTON (r) TS (t) MILTON (c)     Motor Skills/Interventions    Additional Documentation Balance Skills Training (Group)  -MILTON (r) TS (t) MILTON (c)     Balance Skills Training    Sitting-Level of Assistance Independent  -MILTON (r) TS (t) MILTON (c)     Sitting-Balance Support Right upper extremity supported;Left upper extremity supported;Feet supported  -MILTON (r) TS (t) MILTON (c)     Standing-Level of Assistance Contact guard;Close supervision  -MILTON (r) TS (t) MILTON (c)     Static Standing Balance Support No upper extremity supported  -MILTON (r) TS (t) MILTON (c)     Gait Balance-Level of Assistance Contact guard;Close supervision  -MILTON (r) TS (t) MILTON (c)     Gait Balance Support No upper extremity supported  -MILTON (r) TS (t) MILTON (c)     Sensory Assessment/Intervention    Sensory Impairment --   WNL per pt  -MILTON (r) TS (t) MILTON (c)     Positioning and Restraints    Pre-Treatment Position in bed  -MILTON (r) TS (t) MILTON (c)     Post Treatment Position bed  -MILTON (r) TS (t) MILTON (c)     In Bed fowlers;call light within reach;encouraged to call for assist;with family/caregiver  -MILTON (r) TS (t) MILTON (c)       User Key  (r) = Recorded By, (t) = Taken By, (c) = Cosigned By    Initials Name Provider Type    MILTON Tee, PT DPT Physical Therapist    MARIANNE Salazar, RN Registered Nurse     CATIE Mancera, RN Case Manager    LAUREL Leung, RN Registered Nurse     Agus Otto, PT Student PT Student          Physical Therapy Education     Title: PT OT SLP Therapies (Active)     Topic: Physical Therapy (Active)     Point: Mobility training (Done)    Learning Progress Summary    Learner Readiness Method Response Comment Documented by Status   Patient Acceptance E VU Pt educated on benefits of mobility and PT plan of care.  04/14/17 1129 Done   Significant Other Acceptance E VU Pt educated on benefits of mobility and PT plan of care.  04/14/17 1129 Done                      User Key     Initials Effective Dates Name Provider Type Discipline     03/09/17 -  Agus Otto, PT Student PT Student PT                PT Recommendation and Plan  Anticipated Discharge Disposition: home with assist  Planned Therapy Interventions: balance training, gait training, home exercise program, strengthening, transfer training, stair training, patient/family education  PT Frequency: daily, 2 times/day  Plan of Care Review  Plan Of Care Reviewed With: patient, spouse  Outcome Summary/Follow up Plan: PT eval completed. Pt was CGA to stand, and initially CGA with ambulation but progresed to SBA. Pt c/o of fatigue and some SOB with activity, O2 sats dropped to 90% on 2L with activity but recovered to baseline with seated rest. Pt will benefit from continued PT intervention to improve on functional endurance, actiivty tolerance, and dynamic balance. Anticipate d/c to home with assist  pending progress.          IP PT Goals       04/14/17 1118          Transfer Training PT LTG    Transfer Training PT LTG, Date Established 04/14/17  -MILTON (r) TS (t) MILTON (c)      Transfer Training PT LTG, Time to Achieve by discharge  -MILTON (r) TS (t) MILTON (c)      Transfer Training PT LTG, Activity Type all transfers  -MILTON (r) TS (t) MILTON (c)      Transfer Training PT LTG, [place] Level conditional independence  -MILTON (r)  TS (t) MILTON (c)      Gait Training PT LTG    Gait Training Goal PT LTG, Date Established 04/14/17  -MILTON (r) TS (t) MILTON (c)      Gait Training Goal PT LTG, Time to Achieve by discharge  -MILTON (r) TS (t) MILTON (c)      Gait Training Goal PT LTG, [place] Level conditional independence  -MILTON (r) TS (t) MILTON (c)      Gait Training Goal PT LTG, Distance to Achieve 400  -MILTON (r) TS (t) MILTON (c)      Stair Training PT LTG    Stair Training Goal PT LTG, Date Established 04/14/17  -IMLTON (r) TS (t) MILTON (c)      Stair Training Goal PT LTG, Time to Achieve by discharge  -MILTON (r) TS (t) MILTON (c)      Stair Training Goal PT LTG, Number of Steps 2  -MILTON (r) TS (t) MILTON (c)      Stair Training Goal PT LTG, [place] Level supervision required  -MILTON (r) TS (t) MILTON (c)      Dynamic Standing Balance PT LTG    Dynamic Standing Balance PT LTG, Date Established 04/14/17  -MILTON (r) TS (t) MILTON (c)      Dynamic Standing Balance PT LTG, Time to Achieve by discharge  -MILTON (r) TS (t) MILTON (c)      Dynamic Standing Balance PT LTG, [place] Level supervision required  -MILTON (r) TS (t) MILTON (c)      Dynamic Standing Balance PT LTG, Additional Goal Pt will perform reaching activities outside of base of support with no LOB  -MILTON (r) TS (t) MILTON (c)        User Key  (r) = Recorded By, (t) = Taken By, (c) = Cosigned By    Initials Name Provider Type    MILTON Tee, PT DPT Physical Therapist    SAMMI Otto, PT Student PT Student                Outcome Measures       04/14/17 0958          How much help from another person do you currently need...    Turning from your back to your side while in flat bed without using bedrails? 4  -MILTON (r) TS (t) MILTON (c)      Moving from lying on back to sitting on the side of a flat bed without bedrails? 3  -MILTON (r) TS (t) MILTON (c)      Moving to and from a bed to a chair (including a wheelchair)? 3  -MILTON (r) TS (t) MILTON (c)      Standing up from a chair using your arms (e.g., wheelchair, bedside chair)? 3  -MILTON (r) TS (t) MILTON (c)       Climbing 3-5 steps with a railing? 3  -MILTON (r) TS (t) MILTON (c)      To walk in hospital room? 3  -MILTON (r) TS (t) MILTON (c)      AM-PAC 6 Clicks Score 19  -MILTON (r) TS (t)      Functional Assessment    Outcome Measure Options AM-PAC 6 Clicks Basic Mobility (PT)  -MILTON (r) TS (t) MILTON (c)        User Key  (r) = Recorded By, (t) = Taken By, (c) = Cosigned By    Initials Name Provider Type    MILTON Tee, PT DPT Physical Therapist    SAMMI Otto, PT Student PT Student           Time Calculation:         PT Charges       04/14/17 1129          Time Calculation    Start Time 1017  -MILTON (r) TS (t) MILTON (c)      Stop Time 1101  -MILTON (r) TS (t) MILTON (c)      Time Calculation (min) 44 min  -MILTON (r) TS (t)      PT Received On 04/14/17  -MILTON (r) TS (t) MILTON (c)      PT Goal Re-Cert Due Date 04/24/17  -MILTON (r) TS (t) MILTON (c)        User Key  (r) = Recorded By, (t) = Taken By, (c) = Cosigned By    Initials Name Provider Type    MILTON Tee, PT DPT Physical Therapist    SAMMI Otto, PT Student PT Student          Therapy Charges for Today     Code Description Service Date Service Provider Modifiers Qty    24258928014 HC PT EVAL LOW COMPLEXITY 3 4/14/2017 Agus Otto PT Student GP 1          PT G-Codes  Outcome Measure Options: AM-PAC 6 Clicks Basic Mobility (PT)  Score: 19  Functional Limitation: Mobility: Walking and moving around  Mobility: Walking and Moving Around Current Status (): At least 20 percent but less than 40 percent impaired, limited or restricted  Mobility: Walking and Moving Around Goal Status (): At least 1 percent but less than 20 percent impaired, limited or restricted      Agus Otto PT Student  4/14/2017

## 2017-04-14 NOTE — PLAN OF CARE
Problem: Fluid Volume Deficit (Adult)  Goal: Fluid/Electrolyte Balance  Outcome: Outcome(s) achieved Date Met:  04/14/17  Goal: Comfort/Well Being  Outcome: Outcome(s) achieved Date Met:  04/14/17

## 2017-04-14 NOTE — PLAN OF CARE
Problem: Patient Care Overview (Adult)  Goal: Plan of Care Review  Outcome: Ongoing (interventions implemented as appropriate)    04/14/17 1118   Coping/Psychosocial Response Interventions   Plan Of Care Reviewed With patient;spouse   Outcome Evaluation   Outcome Summary/Follow up Plan PT eval completed. Pt was CGA to stand, and initially CGA with ambulation but progresed to SBA. Pt c/o of fatigue and some SOB with activity, O2 sats dropped to 90% on 2L with activity but recovered to baseline with seated rest. Pt will benefit from continued PT intervention to improve on functional endurance, actiivty tolerance, and dynamic balance. Anticipate d/c to home with assist pending progress.         Problem: Inpatient Physical Therapy  Goal: Transfer Training Goal 1 LTG- PT  Outcome: Ongoing (interventions implemented as appropriate)    04/14/17 1118   Transfer Training PT LTG   Transfer Training PT LTG, Date Established 04/14/17   Transfer Training PT LTG, Time to Achieve by discharge   Transfer Training PT LTG, Activity Type all transfers   Transfer Training PT LTG, [place] Level conditional independence       Goal: Gait Training Goal LTG- PT  Outcome: Ongoing (interventions implemented as appropriate)    04/14/17 1118   Gait Training PT LTG   Gait Training Goal PT LTG, Date Established 04/14/17   Gait Training Goal PT LTG, Time to Achieve by discharge   Gait Training Goal PT LTG, [place] Level conditional independence   Gait Training Goal PT LTG, Distance to Achieve 400       Goal: Stair Training Goal LTG- PT  Outcome: Ongoing (interventions implemented as appropriate)    04/14/17 1118   Stair Training PT LTG   Stair Training Goal PT LTG, Date Established 04/14/17   Stair Training Goal PT LTG, Time to Achieve by discharge   Stair Training Goal PT LTG, Number of Steps 2   Stair Training Goal PT LTG, [place] Level supervision required       Goal: Dynamic Standing Balance Goal LTG- PT  Outcome: Ongoing  (interventions implemented as appropriate)    04/14/17 1118   Dynamic Standing Balance PT LTG   Dynamic Standing Balance PT LTG, Date Established 04/14/17   Dynamic Standing Balance PT LTG, Time to Achieve by discharge   Dynamic Standing Balance PT LTG, [place] Level supervision required   Dynamic Standing Balance PT LTG, Additional Goal Pt will perform reaching activities outside of base of support with no LOB

## 2017-04-14 NOTE — PLAN OF CARE
Problem: Patient Care Overview (Adult)  Goal: Plan of Care Review  Outcome: Ongoing (interventions implemented as appropriate)  Goal: Adult Individualization and Mutuality  Outcome: Ongoing (interventions implemented as appropriate)  Goal: Discharge Needs Assessment  Outcome: Ongoing (interventions implemented as appropriate)    Problem: Renal Failure/Kidney Injury, Acute (Adult)  Goal: Signs and Symptoms of Listed Potential Problems Will be Absent or Manageable (Renal Failure/Kidney Injury, Acute)  Outcome: Ongoing (interventions implemented as appropriate)    Problem: Cardiac Output, Decreased (Adult)  Goal: Identify Related Risk Factors and Signs and Symptoms  Outcome: Ongoing (interventions implemented as appropriate)  Goal: Adequate Cardiac Output/Effective Tissue Perfusion  Outcome: Ongoing (interventions implemented as appropriate)

## 2017-04-14 NOTE — PLAN OF CARE
Problem: Patient Care Overview (Adult)  Goal: Plan of Care Review  Outcome: Ongoing (interventions implemented as appropriate)    04/14/17 8274   Coping/Psychosocial Response Interventions   Plan Of Care Reviewed With patient   Patient Care Overview   Progress improving   Outcome Evaluation   Outcome Summary/Follow up Plan Pt reports poor appetite and constipation that he was having at home has now resolved. Appetite is much better presently, avg PO intake 75%/2meals today, in addition to Pt eating additional snacks per his report. RD encouraged intake, advised alt selections and available snacks. Will continue to FU.

## 2017-04-15 NOTE — PLAN OF CARE
Problem: Patient Care Overview (Adult)  Goal: Plan of Care Review  Outcome: Ongoing (interventions implemented as appropriate)    04/15/17 0654   Coping/Psychosocial Response Interventions   Plan Of Care Reviewed With patient   Patient Care Overview   Progress no change   Outcome Evaluation   Outcome Summary/Follow up Plan Voiding per urinal, no difficulty noted. HR sinus on tele, BP stable.         Problem: Renal Failure/Kidney Injury, Acute (Adult)  Goal: Signs and Symptoms of Listed Potential Problems Will be Absent or Manageable (Renal Failure/Kidney Injury, Acute)  Outcome: Ongoing (interventions implemented as appropriate)    Problem: Cardiac Output, Decreased (Adult)  Goal: Adequate Cardiac Output/Effective Tissue Perfusion  Outcome: Ongoing (interventions implemented as appropriate)

## 2017-04-15 NOTE — PLAN OF CARE
Problem: Patient Care Overview (Adult)  Goal: Plan of Care Review  Outcome: Ongoing (interventions implemented as appropriate)    04/15/17 1330   Coping/Psychosocial Response Interventions   Plan Of Care Reviewed With patient   Patient Care Overview   Progress no change   Outcome Evaluation   Outcome Summary/Follow up Plan C/o nausea, meds given. Labs improved.          Problem: Renal Failure/Kidney Injury, Acute (Adult)  Goal: Signs and Symptoms of Listed Potential Problems Will be Absent or Manageable (Renal Failure/Kidney Injury, Acute)  Outcome: Ongoing (interventions implemented as appropriate)    Problem: Cardiac Output, Decreased (Adult)  Goal: Identify Related Risk Factors and Signs and Symptoms  Outcome: Ongoing (interventions implemented as appropriate)  Goal: Adequate Cardiac Output/Effective Tissue Perfusion  Outcome: Ongoing (interventions implemented as appropriate)

## 2017-04-15 NOTE — PLAN OF CARE
Problem: Patient Care Overview (Adult)  Goal: Plan of Care Review  Outcome: Ongoing (interventions implemented as appropriate)    04/15/17 2270   Coping/Psychosocial Response Interventions   Plan Of Care Reviewed With patient   Patient Care Overview   Progress improving   Outcome Evaluation   Outcome Summary/Follow up Plan Pt condtionally independent with bed mobililty, transfers and to/from bathroom, states he has walked himself to/from bathroom all night and today. Supervision for gait of 200' with no gait. Pt states he thinks he will be going home tonight or in the morning.

## 2017-04-15 NOTE — PROGRESS NOTES
[facility] Medicine Services  INPATIENT PROGRESS NOTE    Length of Stay: 2  Date of Admission: 4/13/2017  Primary Care Physician: Bob Fulton MD    Subjective   Chief Complaint: weakness, nausea    HPI   Patient resting quietly.  Wife at bedside.  She did most of the talking throughout the interview.  She is quite anxious.  She reports he is having nausea secondary to his Tenormin.  I did at one point asked the wife to let me talk to the patient, he is in agreement, he feels nausea is caused by the Tenormin.  Therefore, we will hold it tonight and monitor his vital signs and his nausea.  I have instructed the nurse to give Zofran before meals.  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  He and wife do not feel that this may be the cause of his nausea.  He denies shortness of breathing, states he started wearing oxygen around the clock last week when seen by his primary care provider's nurse practitioner, currently his saturation is 97% on 2 L nasal cannula.  We will discontinue the continuous pulse ox.  He has no complaints of abdominal pain or chest pain.  He is stable.    Review of Systems   Gastrointestinal: Positive for nausea.   Neurological: Positive for weakness (generalized).        All pertinent negatives and positives are as above. All other systems have been reviewed and are negative unless otherwise stated.     Objective    Temp:  [97 °F (36.1 °C)-98.4 °F (36.9 °C)] 98.2 °F (36.8 °C)  Heart Rate:  [61-76] 61  Resp:  [16-20] 20  BP: (117-128)/(46-62) 126/52    Physical Exam   Constitutional: He is oriented to person, place, and time. He appears well-developed and well-nourished. No distress.   HENT:   Head: Normocephalic and atraumatic.   Eyes: Conjunctivae and EOM are normal. Pupils are equal, round, and reactive to light. No scleral icterus.   Neck: Normal range of motion. Neck supple. No JVD present. No  tracheal deviation present.   Cardiovascular: Normal rate, regular rhythm, normal heart sounds and intact distal pulses.  Exam reveals no gallop.    No murmur heard.  Pulmonary/Chest: Effort normal and breath sounds normal. No respiratory distress. He has no wheezes. He has no rales.   Abdominal: Soft. Bowel sounds are normal. He exhibits no distension. There is no tenderness. There is no guarding.   Musculoskeletal: Normal range of motion. He exhibits no edema.   Neurological: He is alert and oriented to person, place, and time.   No obvious deficits noted.   Skin: Skin is warm and dry. No rash noted. He is not diaphoretic. No erythema. No pallor.   Psychiatric: He has a normal mood and affect. His behavior is normal.   Vitals reviewed.      Results Review:  Recent Results (from the past 12 hour(s))   Basic Metabolic Panel    Collection Time: 04/15/17  4:18 AM   Result Value Ref Range    Glucose 124 (H) 70 - 100 mg/dL    BUN 22 (H) 5 - 21 mg/dL    Creatinine 1.39 0.50 - 1.40 mg/dL    Sodium 140 135 - 145 mmol/L    Potassium 4.3 3.5 - 5.3 mmol/L    Chloride 102 98 - 110 mmol/L    CO2 27.0 24.0 - 31.0 mmol/L    Calcium 9.3 8.4 - 10.4 mg/dL    eGFR Non African Amer 50 (L) >60 mL/min/1.73    BUN/Creatinine Ratio 15.8 7.0 - 25.0    Anion Gap 11.0 4.0 - 13.0 mmol/L   CBC Auto Differential    Collection Time: 04/15/17  4:18 AM   Result Value Ref Range    WBC 5.79 4.80 - 10.80 10*3/mm3    RBC 2.98 (L) 4.80 - 5.90 10*6/mm3    Hemoglobin 9.9 (L) 14.0 - 18.0 g/dL    Hematocrit 29.9 (L) 40.0 - 52.0 %    .3 (H) 82.0 - 95.0 fL    MCH 33.2 (H) 28.0 - 32.0 pg    MCHC 33.1 33.0 - 36.0 g/dL    RDW 12.8 12.0 - 15.0 %    RDW-SD 46.5 40.0 - 54.0 fl    MPV 11.1 6.0 - 12.0 fL    Platelets 269 130 - 400 10*3/mm3    Neutrophil % 52.1 39.0 - 78.0 %    Lymphocyte % 26.6 15.0 - 45.0 %    Monocyte % 17.1 (H) 4.0 - 12.0 %    Eosinophil % 2.8 0.0 - 4.0 %    Basophil % 0.7 0.0 - 2.0 %    Immature Grans % 0.7 0.0 - 5.0 %    Neutrophils,  Absolute 3.02 1.87 - 8.40 10*3/mm3    Lymphocytes, Absolute 1.54 0.72 - 4.86 10*3/mm3    Monocytes, Absolute 0.99 0.19 - 1.30 10*3/mm3    Eosinophils, Absolute 0.16 0.00 - 0.70 10*3/mm3    Basophils, Absolute 0.04 0.00 - 0.20 10*3/mm3    Immature Grans, Absolute 0.04 (H) 0.00 - 0.03 10*3/mm3       Radiology Data:    Imaging Results (last 24 hours)     Procedure Component Value Units Date/Time    US Renal Limited [[id number]] Collected:  04/14/17 1355     Updated:  04/14/17 1420    Narrative:       EXAMINATION:   US RENAL LIMITED-  4/14/2017 1:53 PM CDT     HISTORY: Acute kidney injury     Images are obtained transverse and longitudinal direction in the usual  manner. Right renal contour is 4.6 x 5.5 cm in diameter. Right kidney is  11.2 cm in sagittal length.     There is a 1.6 cm cyst upper pole of the right kidney. There is no  evidence of a mass or hydronephrosis. Color flow is present.     Left renal contour is 4.9 to 6.9 cm in diameter and 12 cm in sagittal  length. There are no solid or cystic lesions.     The bladder is unremarkable.       Impression:       1.6 cm cyst upper pole of the right kidney.  This report was finalized on 04/14/2017 14:17 by Dr. Eduar Vera MD.        ECHO  Interpretation Summary      · Left ventricular function is normal. Estimated EF = 60%.  · Mild aortic valve stenosis is present.  · Estimated right ventricular systolic pressure from tricuspid regurgitation is normal (<35 mmHg).         Intake/Output Summary (Last 24 hours) at 04/15/17 1330  Last data filed at 04/15/17 1225   Gross per 24 hour   Intake          3437.53 ml   Output              700 ml   Net          2737.53 ml       No Known Allergies    Scheduled meds:     aspirin 81 mg Oral Daily   atenolol 25 mg Oral Q24H   atorvastatin 10 mg Oral Nightly   clopidogrel 75 mg Oral Q PM   enoxaparin 30 mg Subcutaneous Q24H   levothyroxine 125 mcg Oral Q AM   sennosides-docusate sodium 2 tablet Oral BID       PRN meds:  •   acetaminophen  •  ondansetron  •  sodium chloride  •  Insert peripheral IV **AND** sodium chloride    Assessment/Plan     Active Problems:    Hypotension  Weakness  Coronary artery disease with known blockage - cardiac catheterization in February reported to demonstrate gnosis of the RCA with 60-70% stenosis of the left circumflex.  Acute kidney injury improving  Dehydration/hypovolemia - resolving  History of mesothelioma  Hypothyroidism     Plan:  Appreciate consultants - No plans for cabg according to patient's understanding  Cont IVF   CBC and BMP in AM  Increase activities  Optimized treatment according to cardiologist  Discussed with nurse - give Zofran before meals, encourage increased oral intake    Discharge Planning:  Will discharge tomorrow if remaining stable      Xiomara Toure, APRN   04/15/17   1:30 PM        I have seen and agree with the above note by Xiomara Toure.  We will continue to work with the patient to try to get him home I also given additionally check orthostatic BPs I agree with holding the Tenormin and see if this helps with his nausea.  Patient and family need to be encouraged to be more motivated ambulate.

## 2017-04-15 NOTE — PROGRESS NOTES
"Pharmacy Anticoagulation Note - Lovenox  Kvng Nolan is a 76 y.o. male on Enoxaparin 30 mg SQ Q24H for indication of VTE prophylaxis.    [Ht: 68\" (172.7 cm); Wt: 215 lb 6 oz (97.7 kg);  BMI: Body mass index is 32.75 kg/(m^2).]  Estimated Creatinine Clearance: 52 mL/min (by C-G formula based on Cr of 1.39).   Lab Results   Component Value Date    INR 0.91 07/27/2015    INR 0.91 06/24/2015    INR 1.08 05/16/2015    PROTIME 12.6 07/27/2015    PROTIME 12.6 06/24/2015    PROTIME 14.3 05/16/2015      Lab Results   Component Value Date    HGB 9.9 (L) 04/15/2017    HGB 9.8 (L) 04/14/2017    HGB 9.8 (L) 04/13/2017      Lab Results   Component Value Date     04/15/2017     04/14/2017     04/13/2017     Assessment/Plan:  Renal function improved. Lovenox dose adjusted to 40mg sc q24 based on indication and est crcl greater than 30. Pharmacy will continue to follow daily and adjust as needed.    MITUL Dejesus, Pharm.D.    04/15/17 2:06 PM     "

## 2017-04-15 NOTE — PROGRESS NOTES
[facility] -  Progress Note     LOS: 2 days   Patient Care Team:  Bob Fulton MD as PCP - General  Bob Fulton MD as PCP - Family Medicine  Viktoria Jolley MD as PCP - Claims Attributed - PLEASE DO NOT REMOVE  Tejinder Britton MD as Cardiologist (Cardiology)    Chief Complaint:       Subjective     Interval History:   Patient and wife express concern regarding nausea, which started 30 minutes after Atenolol was administered yesterday evening. They are not ready for discharge- they are quite concerned about his nausea. Denies chest pain, shortness of breath, edema.         Review of Systems:   Review of Systems   Constitution: Negative for diaphoresis, fever, weakness and malaise/fatigue.   Cardiovascular: Negative for chest pain and leg swelling.   Respiratory: Positive for shortness of breath. Negative for cough.    Skin: Negative for rash.   Musculoskeletal: Negative for falls.   Gastrointestinal: Positive for abdominal pain and nausea. Negative for constipation, heartburn and vomiting.   Neurological: Negative for dizziness, focal weakness and light-headedness.   Psychiatric/Behavioral: Negative for altered mental status.        Objective     Vital Sign Min/Max for last 24 hours  Temp  Min: 97 °F (36.1 °C)  Max: 98.4 °F (36.9 °C)   BP  Min: 117/55  Max: 128/62   Pulse  Min: 61  Max: 76   Resp  Min: 16  Max: 20   SpO2  Min: 92 %  Max: 99 %   Flow (L/min)  Min: 2  Max: 2   Weight  Min: 215 lb 6 oz (97.7 kg)  Max: 215 lb 6 oz (97.7 kg)     Last Weight    04/14/17 2057   Weight: 215 lb 6 oz (97.7 kg)         Intake/Output Summary (Last 24 hours) at 04/15/17 1517  Last data filed at 04/15/17 1300   Gross per 24 hour   Intake          4037.53 ml   Output              700 ml   Net          3337.53 ml         Physical Exam:  Physical Exam   Constitutional: He is oriented to person, place, and time. He appears well-developed and well-nourished. He is cooperative. No distress.  Nasal cannula in place.   Laying in bed, Wife at bedside.    HENT:   Head: Normocephalic and atraumatic.   Cardiovascular: Normal rate, regular rhythm, S1 normal, S2 normal and intact distal pulses.    Murmur heard.  Telemetry: NSR 60's-70's    Pulmonary/Chest: Breath sounds normal. No accessory muscle usage. No respiratory distress.   Abdominal: Soft. Normal appearance and bowel sounds are normal. He exhibits no distension. There is no tenderness.   Musculoskeletal: He exhibits no edema.   Neurological: He is alert and oriented to person, place, and time.   Skin: Skin is warm and dry. No rash noted. He is not diaphoretic.   Psychiatric: He has a normal mood and affect. His speech is normal and behavior is normal.        Results Review:   Lab Results   Component Value Date    WBC 5.79 04/15/2017    HGB 9.9 (L) 04/15/2017    HCT 29.9 (L) 04/15/2017    .3 (H) 04/15/2017     04/15/2017     Lab Results   Component Value Date    GLUCOSE 124 (H) 04/15/2017    CALCIUM 9.3 04/15/2017     04/15/2017    K 4.3 04/15/2017    CO2 27.0 04/15/2017     04/15/2017    BUN 22 (H) 04/15/2017    CREATININE 1.39 04/15/2017    EGFRIFAFRI  09/23/2016      Comment:      <15 Indicative of kidney failure.    EGFRIFNONA 50 (L) 04/15/2017    BCR 15.8 04/15/2017    ANIONGAP 11.0 04/15/2017     Lab Results   Component Value Date    DDIMER 1.58 (H) 04/13/2017     No results found for: CHOL  Lab Results   Component Value Date    TRIG 111 03/18/2016    TRIG 89 07/31/2015     Lab Results   Component Value Date    HDL 61 03/18/2016    HDL 45 07/31/2015       Echo EF Estimated  Lab Results   Component Value Date    ECHOEFEST 60 04/14/2017   · Left ventricular function is normal. Estimated EF = 60%.  · Mild aortic valve stenosis is present.  Estimated right ventricular systolic pressure from tricuspid regurgitation is normal (<35 mmHg).      Medication Review: yes  Current Facility-Administered Medications   Medication Dose  Route Frequency Provider Last Rate Last Dose   • acetaminophen (TYLENOL) tablet 650 mg  650 mg Oral Q4H PRN Krystle Whittaker MD       • aspirin EC tablet 81 mg  81 mg Oral Daily Krystle Whittaker MD   81 mg at 04/15/17 0856   • atorvastatin (LIPITOR) tablet 10 mg  10 mg Oral Nightly Jorge Go MD   10 mg at 04/14/17 2146   • clopidogrel (PLAVIX) tablet 75 mg  75 mg Oral Q PM Krystle Whittaker MD   75 mg at 04/14/17 1620   • enoxaparin (LOVENOX) syringe 40 mg  40 mg Subcutaneous Q24H Tristan Link MD       • levothyroxine (SYNTHROID, LEVOTHROID) tablet 125 mcg  125 mcg Oral Q AM Krystle Whittaker MD   125 mcg at 04/15/17 0641   • ondansetron (ZOFRAN) tablet 4 mg  4 mg Oral Q6H PRN Krystle Whittaker MD   4 mg at 04/15/17 0859   • sennosides-docusate sodium (SENOKOT-S) 8.6-50 MG tablet 2 tablet  2 tablet Oral BID Krystle Whittaker MD   2 tablet at 04/14/17 1738   • sodium chloride 0.9 % flush 1-10 mL  1-10 mL Intravenous PRN Krystle Whittaker MD       • sodium chloride 0.9 % flush 10 mL  10 mL Intravenous PRN Akil Agarwal Jr., MD       • sodium chloride 0.9 % infusion  100 mL/hr Intravenous Continuous Krystle Whittaker  mL/hr at 04/15/17 0957 100 mL/hr at 04/15/17 0957         Assessment/Plan     Active Problems:  -Nausea   -Hypotension  -Generalized Weakness  -Coronary artery disease with known blockage- right coronary artery and left circumflex artery. No plans for bypass grafting at this time per Dr. Hogue.   -Acute kidney injury- improving  -Dehydration/hypovolemia - resolving  -Metastatic Mesothelioma  -Elevated D-Dimer       Plan   1. Monitor telemetry  2. Continue ASA, Plavix, Lipitor. Continue to optimize CAD medical therapy. Consider restarting lwo dose beta blocker once nausea resolves.   3. Low sodium diet  4. VTE Lovenox   5. Plans for possible discharge in the morning     Paradise King, BRAYDON  04/15/17  3:16 PM

## 2017-04-16 NOTE — PLAN OF CARE
Problem: Patient Care Overview (Adult)  Goal: Plan of Care Review  Outcome: Ongoing (interventions implemented as appropriate)    04/16/17 0435   Coping/Psychosocial Response Interventions   Plan Of Care Reviewed With patient   Patient Care Overview   Progress improving         Problem: Renal Failure/Kidney Injury, Acute (Adult)  Goal: Signs and Symptoms of Listed Potential Problems Will be Absent or Manageable (Renal Failure/Kidney Injury, Acute)  Outcome: Ongoing (interventions implemented as appropriate)    04/16/17 0435   Renal Failure/Kidney Injury, Acute   Problems Assessed (Acute Renal Failure/Kidney Injury) all   Problems Present (Acute Renal Failure/Kidney Injury) none         Problem: Cardiac Output, Decreased (Adult)  Goal: Identify Related Risk Factors and Signs and Symptoms  Outcome: Ongoing (interventions implemented as appropriate)    04/16/17 0435   Cardiac Output, Decreased   Cardiac Output, Decreased: Related Risk Factors oxygenation decreased   Signs and Symptoms (Cardiac Output Decreased) dyspnea

## 2017-04-16 NOTE — DISCHARGE INSTRUCTIONS
Closely monitor and record your blood pressure at home and bring the readings with you to the appointment with Dr Britton.

## 2017-04-16 NOTE — PROGRESS NOTES
[facility] -  Progress Note     LOS: 3 days   Patient Care Team:  Bob Fulton MD as PCP - General  Bob Fulton MD as PCP - Family Medicine  Viktoria Jolley MD as PCP - Claims Attributed - PLEASE DO NOT REMOVE  Tejinder Britton MD as Cardiologist (Cardiology)    Chief Complaint:       Subjective     Interval History:   Nausea has improved. Plans for discharge home today.         Review of Systems:   Review of Systems   Constitution: Negative for diaphoresis, fever, weakness and malaise/fatigue.   Cardiovascular: Positive for dyspnea on exertion. Negative for chest pain and leg swelling.   Respiratory: Positive for shortness of breath. Negative for cough.    Skin: Negative for rash.   Musculoskeletal: Negative for falls.   Gastrointestinal: Positive for nausea. Negative for abdominal pain, constipation, heartburn and vomiting.   Neurological: Negative for dizziness, focal weakness and light-headedness.   Psychiatric/Behavioral: Negative for altered mental status.        Objective     Vital Sign Min/Max for last 24 hours  Temp  Min: 97.4 °F (36.3 °C)  Max: 98.6 °F (37 °C)   BP  Min: 99/56  Max: 126/52   Pulse  Min: 58  Max: 65   Resp  Min: 18  Max: 20   SpO2  Min: 93 %  Max: 97 %   Flow (L/min)  Min: 1.5  Max: 2   Weight  Min: 217 lb (98.4 kg)  Max: 217 lb (98.4 kg)     Last Weight    04/15/17  2004   Weight: 217 lb (98.4 kg)         Intake/Output Summary (Last 24 hours) at 04/16/17 0902  Last data filed at 04/15/17 2019   Gross per 24 hour   Intake          2596.65 ml   Output              300 ml   Net          2296.65 ml         Physical Exam:  Physical Exam   Constitutional: He is oriented to person, place, and time. He appears well-developed and well-nourished. He is cooperative. No distress. Nasal cannula in place.   Wife at bedside    HENT:   Head: Normocephalic and atraumatic.   Cardiovascular: Normal rate, regular rhythm, normal heart sounds and intact distal pulses.     No murmur heard.  Pulmonary/Chest: Breath sounds normal. No accessory muscle usage. No respiratory distress. He exhibits no tenderness.   Abdominal: Soft. Normal appearance and bowel sounds are normal. He exhibits no distension. There is no tenderness.   Musculoskeletal: He exhibits no edema.   Neurological: He is alert and oriented to person, place, and time.   Skin: Skin is warm and dry. No rash noted. He is not diaphoretic.   Psychiatric: He has a normal mood and affect. His speech is normal and behavior is normal.        Results Review:   Lab Results   Component Value Date    WBC 6.02 04/16/2017    HGB 9.8 (L) 04/16/2017    HCT 29.7 (L) 04/16/2017    .3 (H) 04/16/2017     04/16/2017     Lab Results   Component Value Date    GLUCOSE 110 (H) 04/16/2017    CALCIUM 9.3 04/16/2017     04/16/2017    K 4.1 04/16/2017    CO2 26.0 04/16/2017     04/16/2017    BUN 15 04/16/2017    CREATININE 1.20 04/16/2017    EGFRIFAFRI  09/23/2016      Comment:      <15 Indicative of kidney failure.    EGFRIFNONA 59 (L) 04/16/2017    BCR 12.5 04/16/2017    ANIONGAP 12.0 04/16/2017     Lab Results   Component Value Date    DDIMER 1.58 (H) 04/13/2017     No results found for: CHOL  Lab Results   Component Value Date    TRIG 111 03/18/2016    TRIG 89 07/31/2015     Lab Results   Component Value Date    HDL 61 03/18/2016    HDL 45 07/31/2015       Echo EF Estimated  Lab Results   Component Value Date    ECHOEFEST 60 04/14/2017   · Left ventricular function is normal. Estimated EF = 60%.  · Mild aortic valve stenosis is present.  Estimated right ventricular systolic pressure from tricuspid regurgitation is normal (<35 mmHg).      Medication Review: yes  Current Facility-Administered Medications   Medication Dose Route Frequency Provider Last Rate Last Dose   • acetaminophen (TYLENOL) tablet 650 mg  650 mg Oral Q4H PRN Krystle Whittaker MD   650 mg at 04/15/17 6829   • aspirin EC tablet 81 mg  81 mg Oral Daily  Krystle Whittaker MD   81 mg at 04/16/17 0824   • atorvastatin (LIPITOR) tablet 10 mg  10 mg Oral Nightly Jorge Go MD   10 mg at 04/15/17 2019   • clopidogrel (PLAVIX) tablet 75 mg  75 mg Oral Q PM Krystle Whittaker MD   75 mg at 04/15/17 1749   • enoxaparin (LOVENOX) syringe 40 mg  40 mg Subcutaneous Q24H Tristan Link MD   40 mg at 04/15/17 1748   • levothyroxine (SYNTHROID, LEVOTHROID) tablet 125 mcg  125 mcg Oral Q AM Krystle Whittaker MD   125 mcg at 04/16/17 0618   • ondansetron (ZOFRAN) tablet 4 mg  4 mg Oral Q6H PRN Krystle Whittaker MD   4 mg at 04/16/17 0825   • sennosides-docusate sodium (SENOKOT-S) 8.6-50 MG tablet 2 tablet  2 tablet Oral BID Krystle Whittaker MD   2 tablet at 04/16/17 0824   • sodium chloride 0.9 % flush 1-10 mL  1-10 mL Intravenous PRN Krystle Whittaker MD       • sodium chloride 0.9 % flush 10 mL  10 mL Intravenous PRN Akil Agarwal Jr., MD       • sodium chloride 0.9 % infusion  100 mL/hr Intravenous Continuous Krystle Whittaker  mL/hr at 04/16/17 0700 100 mL/hr at 04/16/17 0700         Assessment/Plan     Active Problems:  -Nausea   -Hypotension  -Generalized Weakness  -Coronary artery disease with known blockage- right coronary artery and left circumflex artery. No plans for bypass grafting at this time per Dr. Hogue.   -Acute kidney injury- improving  -Dehydration/hypovolemia - resolving  -Metastatic Mesothelioma  -Elevated D-Dimer       Plan   1. Continue ASA, Plavix, Lipitor. Continue to optimize CAD medical therapy. Restart Imdur and Ranexa.    2. Healthy Heart Diet   3. Plans for discharge home today. OK to discharge from a cardiac standpoint. He has a scheduled follow up with Dr. Britton on 4/26. He was instructed to closely monitor BP at home and bring readings to follow up. Consider restarting beta blocker and/or ARB if BP improves at follow up.     Paradise King, APRN  04/16/17  9:02 AM

## 2017-04-16 NOTE — DISCHARGE SUMMARY
[facility]  DISCHARGE SUMMARY       Date of Admission: 4/13/2017  Date of Discharge:  4/16/2017  Primary Care Physician: Bob Fulton MD    Discharge Diagnoses:  Hospital Problem List     Hypotension  Weakness  Coronary artery disease with known blockage - cardiac catheterization in February reported to demonstrate gnosis of the RCA with 60-70% stenosis of the left circumflex.  Acute kidney injury improving  Dehydration/hypovolemia - resolving  History of mesothelioma  Hypothyroidism  Diabetes mellitus type II         Procedures Performed: None    Pertinent Test Results:   Lab Results (all)     Procedure Component Value Units Date/Time    CBC Auto Differential [[id number]]  (Abnormal) Collected:  04/13/17 1414    Specimen:  Blood Updated:  04/13/17 1425     WBC 7.75 10*3/mm3      RBC 3.17 (L) 10*6/mm3      Hemoglobin 10.7 (L) g/dL      Hematocrit 31.5 (L) %      MCV 99.4 (H) fL      MCH 33.8 (H) pg      MCHC 34.0 g/dL      RDW 12.5 %      RDW-SD 46.2 fl      MPV 10.9 fL      Platelets 289 10*3/mm3      Neutrophil % 67.0 %      Lymphocyte % 18.6 %      Monocyte % 11.1 %      Eosinophil % 1.5 %      Basophil % 0.5 %      Immature Grans % 1.3 %      Neutrophils, Absolute 5.19 10*3/mm3      Lymphocytes, Absolute 1.44 10*3/mm3      Monocytes, Absolute 0.86 10*3/mm3      Eosinophils, Absolute 0.12 10*3/mm3      Basophils, Absolute 0.04 10*3/mm3      Immature Grans, Absolute 0.10 (H) 10*3/mm3     Comprehensive Metabolic Panel [[id number]]  (Abnormal) Collected:  04/13/17 1414    Specimen:  Blood Updated:  04/13/17 1435     Glucose 202 (H) mg/dL      BUN 43 (H) mg/dL      Creatinine 2.83 (H) mg/dL      Sodium 137 mmol/L      Potassium 5.3 mmol/L      Chloride 95 (L) mmol/L      CO2 29.0 mmol/L      Calcium 10.0 mg/dL      Total Protein 7.6 g/dL      Albumin 3.80 g/dL      ALT (SGPT) 18 U/L      AST (SGOT) 33 U/L      Alkaline Phosphatase 111 U/L      Total  Bilirubin 0.8 mg/dL      eGFR Non African Amer 22 (L) mL/min/1.73      Globulin 3.8 gm/dL      A/G Ratio 1.0 (L) g/dL      BUN/Creatinine Ratio 15.2     Anion Gap 13.0 mmol/L     Narrative:       The MDRD GFR formula is only valid for adults with stable renal function between ages 18 and 70.    POC Troponin, Rapid [[id number]]  (Normal) Collected:  04/13/17 1423    Specimen:  Blood Updated:  04/13/17 1437     Troponin I 0.00 ng/mL       Serial Number: [id number]    : [id number]       D-dimer, Quantitative [[id number]]  (Abnormal) Collected:  04/13/17 1414    Specimen:  Blood Updated:  04/13/17 1442     D-Dimer, Quantitative 1.58 (H) mg/L (FEU)     BNP [[id number]]  (Normal) Collected:  04/13/17 1414    Specimen:  Blood Updated:  04/13/17 1443     proBNP 764.0 pg/mL     CK [[id number]]  (Normal) Collected:  04/13/17 1414    Specimen:  Blood Updated:  04/13/17 1627     Creatine Kinase 31 U/L     CK-MB [[id number]]  (Normal) Collected:  04/13/17 1414    Specimen:  Blood Updated:  04/13/17 1627     CKMB 0.67 ng/mL     Narrative:       CKMB Index not indicated    Myoglobin, Serum [[id number]]  (Normal) Collected:  04/13/17 1414    Specimen:  Blood Updated:  04/13/17 1627     Myoglobin 102.1 ng/mL     CBC Auto Differential [[id number]]  (Abnormal) Collected:  04/13/17 1643    Specimen:  Blood Updated:  04/13/17 1717     WBC 7.76 10*3/mm3      RBC 2.95 (L) 10*6/mm3      Hemoglobin 9.8 (L) g/dL      Hematocrit 29.5 (L) %      .0 (H) fL      MCH 33.2 (H) pg      MCHC 33.2 g/dL      RDW 12.7 %      RDW-SD 46.2 fl      MPV 11.0 fL      Platelets 276 10*3/mm3      Neutrophil % 59.6 %      Lymphocyte % 18.9 %      Monocyte % 17.7 (H) %      Eosinophil % 2.1 %      Basophil % 0.4 %      Immature Grans % 1.3 %      Neutrophils, Absolute 4.63 10*3/mm3      Lymphocytes, Absolute 1.47 10*3/mm3      Monocytes, Absolute 1.37 (H) 10*3/mm3      Eosinophils, Absolute 0.16 10*3/mm3      Basophils, Absolute 0.03 10*3/mm3      Immature Grans,  Absolute 0.10 (H) 10*3/mm3     Basic Metabolic Panel [[id number]]  (Abnormal) Collected:  04/13/17 1643    Specimen:  Blood Updated:  04/13/17 1721     Glucose 175 (H) mg/dL      BUN 42 (H) mg/dL      Creatinine 2.83 (H) mg/dL      Sodium 138 mmol/L      Potassium 4.8 mmol/L      Chloride 97 (L) mmol/L      CO2 27.0 mmol/L      Calcium 9.6 mg/dL      eGFR Non African Amer 22 (L) mL/min/1.73      BUN/Creatinine Ratio 14.8     Anion Gap 14.0 (H) mmol/L     Narrative:       The MDRD GFR formula is only valid for adults with stable renal function between ages 18 and 70.    Troponin [[id number]]  (Normal) Collected:  04/13/17 1643    Specimen:  Blood Updated:  04/13/17 1730     Troponin I <0.012 ng/mL     Urinalysis With / Culture If Indicated [[id number]]  (Normal) Collected:  04/13/17 2232    Specimen:  Urine from Urine, Clean Catch Updated:  04/13/17 2240     Color, UA Yellow     Appearance, UA Clear     pH, UA 5.5     Specific Gravity, UA 1.010     Glucose, UA Negative     Ketones, UA Negative     Bilirubin, UA Negative     Blood, UA Negative     Protein, UA Negative     Leuk Esterase, UA Negative     Nitrite, UA Negative     Urobilinogen, UA 0.2 E.U./dL    Narrative:       Urine microscopic not indicated.    Troponin [[id number]]  (Normal) Collected:  04/13/17 2211    Specimen:  Blood Updated:  04/14/17 0028     Troponin I <0.012 ng/mL     CBC (No Diff) [[id number]]  (Abnormal) Collected:  04/16/17 0425    Specimen:  Blood Updated:  04/16/17 0525     WBC 6.02 10*3/mm3      RBC 2.96 (L) 10*6/mm3      Hemoglobin 9.8 (L) g/dL      Hematocrit 29.7 (L) %      .3 (H) fL      MCH 33.1 (H) pg      MCHC 33.0 g/dL      RDW 12.7 %      RDW-SD 46.5 fl      MPV 11.2 fL      Platelets 274 10*3/mm3     Basic Metabolic Panel [[id number]]  (Abnormal) Collected:  04/16/17 0425    Specimen:  Blood Updated:  04/16/17 0542     Glucose 110 (H) mg/dL      BUN 15 mg/dL      Creatinine 1.20 mg/dL      Sodium 140 mmol/L      Potassium 4.1 mmol/L       Chloride 102 mmol/L      CO2 26.0 mmol/L      Calcium 9.3 mg/dL      eGFR Non African Amer 59 (L) mL/min/1.73      BUN/Creatinine Ratio 12.5     Anion Gap 12.0 mmol/L     Narrative:       The MDRD GFR formula is only valid for adults with stable renal function between ages 18 and 70.        Imaging Results (all)     Procedure Component Value Units Date/Time    XR Chest 1 View [[id number]] Collected:  04/13/17 1533     Updated:  04/13/17 1538    Narrative:       EXAMINATION: XR CHEST 1 VW-     4/13/2017 3:07 PM EDT     HISTORY: SOB     Portable chest x-ray compared with 07/27/2015.     Stable heart and mediastinum.  Unchanged left chest wall port.  The right lung is adequately expanded and clear.     There is chronic left lower lobe infiltrate and loculated pleural fluid  which was noted on a chest CT from 01/10/2017 and is not significantly  changed in appearance as compared with 07/27/2015.     Summary:  1. Chronic lower left hemithorax opacification.  2. No significant change.        This report was finalized on 04/13/2017 15:35 by Dr. Doyle Lambert MD.    US Renal Limited [[id number]] Collected:  04/14/17 1355     Updated:  04/14/17 1420    Narrative:       EXAMINATION:   US RENAL LIMITED-  4/14/2017 1:53 PM CDT     HISTORY: Acute kidney injury     Images are obtained transverse and longitudinal direction in the usual  manner. Right renal contour is 4.6 x 5.5 cm in diameter. Right kidney is  11.2 cm in sagittal length.     There is a 1.6 cm cyst upper pole of the right kidney. There is no  evidence of a mass or hydronephrosis. Color flow is present.     Left renal contour is 4.9 to 6.9 cm in diameter and 12 cm in sagittal  length. There are no solid or cystic lesions.     The bladder is unremarkable.       Impression:       1.6 cm cyst upper pole of the right kidney.  This report was finalized on 04/14/2017 14:17 by Dr. Eduar Vera MD.        Consults: Tejinder Britton M.D. cardiology    Chief Complaint on Day of  "Discharge: Generalized malaise, proving    Hospital Course  Patient is a 76 y.o. male presented with progressive weakness starting approximately 2 weeks prior to admission.  He had decrease in appetite, nausea and vomiting.  Patient also stated he has had a 15 pound weight loss over the past few weeks.  When he presented to the emergency department he was noted to have systolic blood pressure in the 70s, diastolic in the 40s and acute kidney injury with creatinine of 2.8, normal 1.1.  Patient was admitted, cardiology was consulted.  His medications have been manipulated significantly.  BNP and troponins remained negative as was his x-ray.  Kidney function has returned to baseline with hydration.  Most recent lab studies from his primary care provider reveals a A1c of 7, glucose have ranged from 127-110 and diabetes will need to be addressed by his primary care provider.  Patient was seen by cardiology, Maria Elena MADISON in conjunction with this provider this a.m.  Patient is quite stable for discharge.  He has been encouraged to increase his activity, fluid intake, monitor his glucose readings and his blood pressure.  He has a history of metastatic mesothelioma, is currently receiving chemotherapy.  Currently patient has follow-up appointments already scheduled with cardiology, primary care provider and oncology.    Condition on Discharge:  Stable    Physical Exam on Discharge:  /56 (BP Location: Right arm, Patient Position: Lying)  Pulse 58  Temp 97.4 °F (36.3 °C) (Tympanic)   Resp 18  Ht 68\" (172.7 cm)  Wt 217 lb (98.4 kg)  SpO2 96%  BMI 32.99 kg/m2     Physical Exam   Constitutional: He is oriented to person, place, and time. He appears well-developed and well-nourished. No distress.   HENT:   Head: Normocephalic and atraumatic.   Eyes: Conjunctivae and EOM are normal. Pupils are equal, round, and reactive to light. No scleral icterus.   Neck: Normal range of motion. Neck supple. No JVD present. No " tracheal deviation present.   Cardiovascular: Normal rate, regular rhythm, normal heart sounds and intact distal pulses.  Exam reveals no gallop.    No murmur heard.  Pulmonary/Chest: Effort normal and breath sounds normal. No respiratory distress. He has no wheezes. He has no rales.   Abdominal: Soft. Bowel sounds are normal. He exhibits no distension. There is no tenderness. There is no guarding.   Musculoskeletal: Normal range of motion. He exhibits no edema.   Neurological: He is alert and oriented to person, place, and time.   No obvious deficits noted.   Skin: Skin is warm and dry. No rash noted. He is not diaphoretic. No erythema. No pallor.   Psychiatric: He has a normal mood and affect. His behavior is normal.   Vitals reviewed.      Discharge Disposition:  Home or Self Care    Discharge Medications:   Kvng Nolan   Home Medication Instructions HOLGER:[id number]    Printed on:04/16/17 4569   Medication Information                      aspirin 81 MG EC tablet  Take 81 mg by mouth daily.             atorvastatin (LIPITOR) 10 MG tablet  TAKE 1 TABLET AT BEDTIME             clopidogrel (PLAVIX) 75 MG tablet  Take 1 tablet by mouth Daily.             diclofenac-misoprostol (ARTHROTEC 75) 75-0.2 MG EC tablet  Take 1 tablet by mouth 2 (two) times a day.             febuxostat (ULORIC) 40 MG tablet  Take 40 mg by mouth daily.             folic acid (FOLVITE) 1 MG tablet  Take 1 mg by mouth daily.             gabapentin (NEURONTIN) 300 MG capsule  Take 300 mg by mouth 3 (three) times a day.             isosorbide mononitrate (IMDUR) 120 MG 24 hr tablet  Take 1 tablet by mouth 2 (Two) Times a Day.             levothyroxine (SYNTHROID) 125 MCG tablet  Take 1 tablet by mouth Daily.             Multiple Vitamins-Minerals (MULTIVITAMIN ADULT PO)  Take  by mouth.             ranolazine (RANEXA) 500 MG 12 hr tablet  Take 1,000 mg by mouth 2 (Two) Times a Day.             vitamin B-12 (CYANOCOBALAMIN) 1000 MCG  tablet  Take 1,000 mcg by mouth daily.             vitamin E 400 UNIT capsule  Take 400 Units by mouth daily.                 Discharge Diet:   Diet Instructions     Diet: Consistent Carbohydrate, Cardiac; Thin Liquids, No Restrictions       Discharge Diet:   Consistent Carbohydrate  Cardiac      Fluid Consistency:  Thin Liquids, No Restrictions  Drink 30-64 ounces of water a day                  Discharge Care Plan / Instructions: Monitor fasting glucose each a.m. and one more throughout the day, log readings and provide to primary care provider at next visit; weight daily notify cardiology for weight gain; monitor B/P daily and prn, provide log to Dr Britton at next visit    Activity at Discharge:   Activity Instructions     Activity as Tolerated                     Follow-up Appointments: Keep scheduled appointments with Dr. Britton and Dr. Fulton    Test Results Pending at Discharge: None     Plan discussed with Tristan Link M.D.    Time spent in face-to-face evaluation, chart review, planning and education 35 minutes.    Xiomara Toure, APRN  04/16/17  9:57 AM

## 2017-04-17 NOTE — PLAN OF CARE
Problem: Inpatient Physical Therapy  Goal: Transfer Training Goal 1 LTG- PT  Outcome: Outcome(s) achieved Date Met:  04/17/17 04/14/17 1118 04/17/17 0802   Transfer Training PT LTG   Transfer Training PT LTG, Date Established 04/14/17 --    Transfer Training PT LTG, Time to Achieve by discharge --    Transfer Training PT LTG, Activity Type all transfers --    Transfer Training PT LTG, [place] Level conditional independence --    Transfer Training PT LTG, Date Goal Reviewed --  04/17/17   Transfer Training PT LTG, Outcome --  goal met       Goal: Gait Training Goal LTG- PT  Outcome: Unable to achieve outcome(s) by discharge Date Met:  04/17/17 04/14/17 1118 04/17/17 0802   Gait Training PT LTG   Gait Training Goal PT LTG, Date Established 04/14/17 --    Gait Training Goal PT LTG, Time to Achieve by discharge --    Gait Training Goal PT LTG, [place] Level conditional independence --    Gait Training Goal PT LTG, Distance to Achieve 400 --    Gait Training Goal PT LTG, Date Goal Reviewed --  04/17/17   Gait Training Goal PT LTG, Outcome --  goal not met   Gait Training Goal PT LTG, Reason Goal Not Met --  discharged from facility       Goal: Stair Training Goal LTG- PT  Outcome: Unable to achieve outcome(s) by discharge Date Met:  04/17/17 04/14/17 1118 04/17/17 0802   Stair Training PT LTG   Stair Training Goal PT LTG, Date Established 04/14/17 --    Stair Training Goal PT LTG, Time to Achieve by discharge --    Stair Training Goal PT LTG, Number of Steps 2 --    Stair Training Goal PT LTG, [place] Level supervision required --    Stair Training Goal PT LTG, Date Goal Reviewed --  04/17/17   Stair Training Goal PT LTG, Outcome --  goal not met   Stair Training Goal PT LTG, Reason Goal Not Met --  discharged from facility       Goal: Dynamic Standing Balance Goal LTG- PT  Outcome: Unable to achieve outcome(s) by discharge Date Met:  04/17/17 04/14/17 1118 04/17/17 0802   Dynamic Standing  Balance PT LTG   Dynamic Standing Balance PT LTG, Date Established 04/14/17 --    Dynamic Standing Balance PT LTG, Time to Achieve by discharge --    Dynamic Standing Balance PT LTG, [place] Level supervision required --    Dynamic Standing Balance PT LTG, Additional Goal Pt will perform reaching activities outside of base of support with no LOB --    Dynamic Standing Balance PT LTG, Date Goal Reviewed --  04/17/17   Dynamic Standing Balance PT LTG, Outcome --  goal not met   Dynamic Standing Balance PT LTG, Reason Goal Not Met --  discharged from facility

## 2017-04-17 NOTE — THERAPY DISCHARGE NOTE
Acute Care - Physical Therapy Discharge Summary  [facility]       Patient Name: Kvng Nolan  : 1940  MRN: [id number]    Today's Date: 2017  Onset of Illness/Injury or Date of Surgery Date: 17    Date of Referral to PT: 17  Referring Physician: Dr. Whittaker (generalized weakness)      Admit Date: 2017      PT Recommendation and Plan    Visit Dx:    ICD-10-CM ICD-9-CM   1. Hypotension, unspecified hypotension type I95.9 458.9   2. Acute renal failure, unspecified acute renal failure type N17.9 584.9   3. Impaired functional mobility and activity tolerance Z74.09 V49.89   4. Type 2 diabetes mellitus without complication, without long-term current use of insulin E11.9 250.00             Outcome Measures       17 0958          How much help from another person do you currently need...    Turning from your back to your side while in flat bed without using bedrails? 4  -MILTON (r) TS (t) MILTON (c)      Moving from lying on back to sitting on the side of a flat bed without bedrails? 3  -MILTON (r) TS (t) MILTON (c)      Moving to and from a bed to a chair (including a wheelchair)? 3  -MILTON (r) TS (t) MILTON (c)      Standing up from a chair using your arms (e.g., wheelchair, bedside chair)? 3  -MILTON (r) TS (t) MILTON (c)      Climbing 3-5 steps with a railing? 3  -MILTON (r) TS (t) MILTON (c)      To walk in hospital room? 3  -MILTON (r) TS (t) MILTON (c)      AM-PAC 6 Clicks Score 19  -MILTON (r) TS (t)      Functional Assessment    Outcome Measure Options AM-PAC 6 Clicks Basic Mobility (PT)  -MILTON (r) TS (t) MILTON (c)        User Key  (r) = Recorded By, (t) = Taken By, (c) = Cosigned By    Initials Name Provider Type    MILTON Tee, PT DPT Physical Therapist    SAMMI Otto, PT Student PT Student                      IP PT Goals       17 0802 17 1118       Transfer Training PT LTG    Transfer Training PT LTG, Date Established  17  -MILTON (r) SAMMI (t) MILTON (c)     Transfer Training PT LTG, Time to Achieve  by  discharge  -MILTON (r) TS (t) MILTON (c)     Transfer Training PT LTG, Activity Type  all transfers  -MILTON (r) TS (t) MILTON (c)     Transfer Training PT LTG, [place] Level  conditional independence  -MILTON (r) TS (t) MILTON (c)     Transfer Training PT  LTG, Date Goal Reviewed 04/17/17  -      Transfer Training PT LTG, Outcome goal met  -      Gait Training PT LTG    Gait Training Goal PT LTG, Date Established  04/14/17  -MILTON (r) TS (t) MILTON (c)     Gait Training Goal PT LTG, Time to Achieve  by discharge  -MILTON (r) TS (t) MILTON (c)     Gait Training Goal PT LTG, [place] Level  conditional independence  -MILTON (r) TS (t) MILTON (c)     Gait Training Goal PT LTG, Distance to Achieve  400  -MILTON (r) TS (t) MILTON (c)     Gait Training Goal PT LTG, Date Goal Reviewed 04/17/17  -      Gait Training Goal PT LTG, Outcome goal not met  -      Gait Training Goal PT LTG, Reason Goal Not Met discharged from [facility]  -      Stair Training PT LTG    Stair Training Goal PT LTG, Date Established  04/14/17  -MILTON (r) TS (t) MILTON (c)     Stair Training Goal PT LTG, Time to Achieve  by discharge  -MILTON (r) TS (t) MILTON (c)     Stair Training Goal PT LTG, Number of Steps  2  -MILTON (r) TS (t) MILTON (c)     Stair Training Goal PT LTG, [place] Level  supervision required  -MILTON (r) TS (t) MILTON (c)     Stair Training Goal PT LTG, Date Goal Reviewed 04/17/17  -      Stair Training Goal PT LTG, Outcome goal not met  -      Stair Training Goal PT LTG, Reason Goal Not Met discharged from [facility]  -      Dynamic Standing Balance PT LTG    Dynamic Standing Balance PT LTG, Date Established  04/14/17  -MILTON (r) TS (t) MILTON (c)     Dynamic Standing Balance PT LTG, Time to Achieve  by discharge  -MILTON (r) TS (t) MILTON (c)     Dynamic Standing Balance PT LTG, [place] Level  supervision required  -MILTON (r) TS (t) MILTON (c)     Dynamic Standing Balance PT LTG, Additional Goal  Pt will perform reaching activities outside of base of support with no LOB  -MILTON (r) TS (t) MILTON [place])     Dynamic  Standing Balance PT LTG, Date Goal Reviewed 04/17/17  -      Dynamic Standing Balance PT LTG, Outcome goal not met  -      Dynamic Standing Balance PT LTG, Reason Goal Not Met discharged from facility  -        User Key  (r) = Recorded By, (t) = Taken By, (c) = Cosigned By    Initials Name Provider Type    MILTON Tee, PT DPT Physical Therapist    KRUPA Tapia PTA Physical Therapy Assistant    SAMMI Otto, PT Student PT Student              PT Discharge Summary  Anticipated Discharge Disposition: home  Reason for Discharge: Discharge from facility  Outcomes Achieved: Patient able to partially acheive established goals  Discharge Destination: Home      Maci Tapia PTA   4/17/2017

## 2017-04-26 NOTE — PROGRESS NOTES
Kvng Nolan  [id number]  1940  76 y.o.  male    Referring Provider: Bob Fulton MD    Reason for Follow-up Visit: Chest pain  Complains of right sided headache for which workup is pending  Due to see nephrologist today  Overall doing well  Intermittent chest pain   Moderate  shortness of breath  Compliant with medications    History of present illness:  Kvng Nolan is a 76 y.o. yo male with history of chest pain who presents today for   Chief Complaint   Patient presents with   • Coronary Artery Disease     2 WK D/C    • Fatigue   • Migraine   • Chest Pain     WITH EXCERTION DOWN INTO ARM    .    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   • Ocular histoplasmosis    • Thrombopenia 9/17/2016   ,   Past Surgical History:   Procedure Laterality Date   • APPENDECTOMY     • CARDIAC CATHETERIZATION      X 3   • CARDIAC CATHETERIZATION Left 2/23/2017    Procedure: Cardiac Catheterization/Vascular Study;  Surgeon: Tejinder Britton MD;  Location:  [facility];  Service:    • CARDIAC CATHETERIZATION Right 2/23/2017    Procedure: Angioplasty-coronary;  Surgeon: Tejinder Britton MD;  Location:  [facility];  Service:    • CORONARY STENT PLACEMENT     • ELBOW PROCEDURE Left    • EYE SURGERY     • HAND SURGERY Right    • THORACENTESIS     ,   Family History   Problem Relation Age of Onset   • No Known Problems Daughter    • Heart disease Mother      Bypass   • Alzheimer's disease Mother    • Heart disease Father    • Diabetes Father    • Cancer Brother      Leukemia   • No Known Problems Maternal Grandmother    • No Known Problems  Maternal Grandfather    • No Known Problems Paternal Grandmother    • No Known Problems Paternal Grandfather    ,   Social History   Substance Use Topics   • Smoking status: Former Smoker     Packs/day: 3.00     Years: 38.00     Types: Cigarettes     Quit date: 9/23/1986   • Smokeless tobacco: Former User   • Alcohol use No   ,     Medications  Current Outpatient Prescriptions   Medication Sig Dispense Refill   • aspirin 81 MG EC tablet Take 81 mg by mouth daily.     • atorvastatin (LIPITOR) 10 MG tablet TAKE 1 TABLET AT BEDTIME 90 tablet 1   • clopidogrel (PLAVIX) 75 MG tablet Take 1 tablet by mouth Daily. 90 tablet 3   • diclofenac-misoprostol (ARTHROTEC 75) 75-0.2 MG EC tablet Take 1 tablet by mouth 2 (two) times a day.     • febuxostat (ULORIC) 40 MG tablet Take 40 mg by mouth daily.     • folic acid (FOLVITE) 1 MG tablet Take 1 mg by mouth daily.     • gabapentin (NEURONTIN) 300 MG capsule Take 300 mg by mouth 3 (three) times a day.     • isosorbide mononitrate (IMDUR) 120 MG 24 hr tablet Take 1 tablet by mouth 2 (Two) Times a Day. 180 tablet 3   • levothyroxine (SYNTHROID) 125 MCG tablet Take 1 tablet by mouth Daily. (Patient taking differently: Take 100 mcg by mouth Daily.) 90 tablet 3   • Multiple Vitamins-Minerals (MULTIVITAMIN ADULT PO) Take  by mouth.     • ranolazine (RANEXA) 500 MG 12 hr tablet Take 1,000 mg by mouth 2 (Two) Times a Day.     • vitamin B-12 (CYANOCOBALAMIN) 1000 MCG tablet Take 1,000 mcg by mouth daily.     • vitamin E 400 UNIT capsule Take 400 Units by mouth daily.       No current facility-administered medications for this visit.        Allergies:  Review of patient's allergies indicates no known allergies.    Review of Systems  Review of Systems   Constitution: Negative.   HENT: Negative.    Eyes: Negative.    Cardiovascular: Positive for chest pain and dyspnea on exertion. Negative for claudication, cyanosis, irregular heartbeat, leg swelling, near-syncope, orthopnea, palpitations,  "paroxysmal nocturnal dyspnea and syncope.   Respiratory: Negative.    Endocrine: Negative.    Hematologic/Lymphatic: Negative.    Skin: Negative.    Gastrointestinal: Negative for anorexia.   Genitourinary: Negative.    Neurological: Negative.    Psychiatric/Behavioral: Negative.    Complains of right sided headache    Objective     Physical Exam:  /64  Pulse 95  Ht 68\" (172.7 cm)  Wt 221 lb (100 kg)  SpO2 97%  BMI 33.6 kg/m2  Physical Exam   Constitutional: He appears well-developed.   HENT:   Head: Normocephalic.   Neck: Normal carotid pulses and no JVD present. No tracheal tenderness present. Carotid bruit is not present. No tracheal deviation and no edema present.   Cardiovascular: Regular rhythm, normal heart sounds and normal pulses.    Pulmonary/Chest: Effort normal. No stridor.   Abdominal: Soft.   Neurological: He is alert. He has normal strength. No cranial nerve deficit or sensory deficit.   Skin: Skin is warm.   Psychiatric: He has a normal mood and affect. His speech is normal and behavior is normal.   Nursing note and vitals reviewed.      Results Review:     Procedures    Assessment/Plan   Patient Active Problem List   Diagnosis   • Malignant neoplasm of pleura   • Mesothelioma of pleura   • Thrombopenia   • Encounter for antineoplastic chemotherapy   • COPD (chronic obstructive pulmonary disease)   • Essential hypertension   • Drug-induced hypothyroidism   • Low back pain without sciatica   • Chest pain on exertion   • Ischemic chest pain   • Coronary artery disease of native artery of native heart with stable angina pectoris   • Hypotension       No palpitations. No significant pedal edema. Compliant with medications and diet. Latest labs and medications reviewed.      Plan:    Suggest workup for severe headache and dyspnea  Will discuss with Dr Hogue. Subsequently the office of Dr Hogue was contacted and was told that workup in preparation for CABG is in the process.  Continue Ranexa " 1000 mg BID  EECP pending  Maximize medical therapy  Consider intensive medical therapy  Close follow up with you as scheduled.  Intensive factor modifications.  See order list.    Counseled regarding disease appropriate diet, fluid, caffeine, stimulants and sodium intake as well as importance of compliance to diet, exercise and regular follow up.    Return in about 2 weeks (around 5/10/2017) for Recheck.

## 2017-05-03 PROBLEM — R09.02 HYPOXIA: Status: ACTIVE | Noted: 2017-01-01

## 2017-05-03 PROBLEM — I50.9 ACUTE CONGESTIVE HEART FAILURE (HCC): Status: ACTIVE | Noted: 2017-01-01

## 2017-06-23 ASSESSMENT — ENCOUNTER SYMPTOMS
BACK PAIN: 1
NAUSEA: 1
SHORTNESS OF BREATH: 1
COUGH: 0

## (undated) DEVICE — 6F .070 JR 4 100CM: Brand: CORDIS

## (undated) DEVICE — BALN EUPHORA 2X10MM

## (undated) DEVICE — PINNACLE INTRODUCER SHEATH: Brand: PINNACLE

## (undated) DEVICE — RUNTHROUGH NS EXTRA FLOPPY PTCA GUIDEWIRE: Brand: RUNTHROUGH

## (undated) DEVICE — GW PRESSUREWIRE X WIRELESS FFR 175CM

## (undated) DEVICE — SOL NS 500ML

## (undated) DEVICE — MYNXGRIP 6F/7F: Brand: MYNXGRIP

## (undated) DEVICE — CANN CO2/O2 NASL A/

## (undated) DEVICE — MODEL AT P65, P/N 701554-001KIT CONTENTS: HAND CONTROLLER, 3-WAY HIGH-PRESSURE STOPCOCK WITH ROTATING END AND PREMIUM HIGH-PRESSURE TUBING: Brand: ANGIOTOUCH® KIT

## (undated) DEVICE — GW STARTER FXD CORE J .035 3X150CM 3MM

## (undated) DEVICE — TREK CORONARY DILATATION CATHETER 2.50 MM X 12 MM / RAPID-EXCHANGE: Brand: TREK

## (undated) DEVICE — PK CATH CARD 30

## (undated) DEVICE — COPILOT BLEEDBACK CONTROL VALVE: Brand: COPILOT

## (undated) DEVICE — MINI TREK CORONARY DILATATION CATHETER 2.0 MM X 12 MM / RAPID-EXCHANGE: Brand: MINI TREK

## (undated) DEVICE — GUIDELINER CATHETERS ARE INTENDED TO BE USED IN CONJUNCTION WITH GUIDE CATHETERS TO ACCESS DISCRETE REGIONS OF THE CORONARY AND/OR PERIPHERAL VASCULATURE, AND TO FACILITATE PLACEMENT OF INTERVENTIONAL DEVICES.: Brand: GUIDELINER® V3 CATHETER

## (undated) DEVICE — INFLATION DEVICE: Brand: ENCORE™ 26

## (undated) DEVICE — FR5 INFINITI MULTIPAC: Brand: INFINITI

## (undated) DEVICE — MODEL BT2000 P/N 700287-012KIT CONTENTS: MANIFOLD WITH SALINE AND CONTRAST PORTS, SALINE TUBING WITH SPIKE AND HAND SYRINGE, TRANSDUCER: Brand: BT2000 AUTOMATED MANIFOLD KIT

## (undated) DEVICE — TOOL INSRT GW MTL OR PLSTC

## (undated) DEVICE — ST PRIM PUMP 20DRP 3VLV 127IN

## (undated) DEVICE — SOLIDIFIER LIQUI LOC PLUS 2000CC

## (undated) DEVICE — SOL NACL 0.9PCT 100ML SGL

## (undated) DEVICE — SOL IRR NACL 0.9PCT BT 1000ML

## (undated) DEVICE — SOL NACL INJ 0.9PCT 50ML

## (undated) DEVICE — ELECTRD PAD DEFIB A/